# Patient Record
Sex: FEMALE | Race: BLACK OR AFRICAN AMERICAN | Employment: FULL TIME | URBAN - METROPOLITAN AREA
[De-identification: names, ages, dates, MRNs, and addresses within clinical notes are randomized per-mention and may not be internally consistent; named-entity substitution may affect disease eponyms.]

---

## 2021-09-27 ENCOUNTER — HOSPITAL ENCOUNTER (OUTPATIENT)
Dept: PREADMISSION TESTING | Age: 71
Discharge: HOME | End: 2021-09-27
Payer: MEDICARE

## 2021-09-27 VITALS
RESPIRATION RATE: 18 BRPM | WEIGHT: 267.2 LBS | HEART RATE: 62 BPM | DIASTOLIC BLOOD PRESSURE: 88 MMHG | BODY MASS INDEX: 36.19 KG/M2 | TEMPERATURE: 97.8 F | SYSTOLIC BLOOD PRESSURE: 130 MMHG | HEIGHT: 72 IN | OXYGEN SATURATION: 99 %

## 2021-09-27 LAB
ABO + RH BLD: NORMAL
ANION GAP SERPL CALC-SCNC: 7 MMOL/L (ref 5–15)
APPEARANCE UR: CLEAR
BACTERIA URNS QL MICRO: NEGATIVE /HPF
BILIRUB UR QL: NEGATIVE
BLOOD GROUP ANTIBODIES SERPL: NEGATIVE
BUN SERPL-MCNC: 16 MG/DL (ref 6–20)
BUN/CREAT SERPL: 24 (ref 12–20)
CA-I BLD-MCNC: 9 MG/DL (ref 8.5–10.1)
CHLORIDE SERPL-SCNC: 105 MMOL/L (ref 97–108)
CO2 SERPL-SCNC: 27 MMOL/L (ref 21–32)
COLOR UR: ABNORMAL
CREAT SERPL-MCNC: 0.67 MG/DL (ref 0.55–1.02)
ERYTHROCYTE [DISTWIDTH] IN BLOOD BY AUTOMATED COUNT: 16.4 % (ref 11.5–14.5)
GLUCOSE SERPL-MCNC: 104 MG/DL (ref 65–100)
GLUCOSE UR STRIP.AUTO-MCNC: NEGATIVE MG/DL
HCT VFR BLD AUTO: 37.5 % (ref 35–47)
HGB BLD-MCNC: 12.3 G/DL (ref 11.5–16)
HGB UR QL STRIP: ABNORMAL
INR PPP: 1.1 (ref 0.9–1.1)
KETONES UR QL STRIP.AUTO: NEGATIVE MG/DL
LEUKOCYTE ESTERASE UR QL STRIP.AUTO: ABNORMAL
MCH RBC QN AUTO: 25.6 PG (ref 26–34)
MCHC RBC AUTO-ENTMCNC: 32.8 G/DL (ref 30–36.5)
MCV RBC AUTO: 78 FL (ref 80–99)
MRSA DNA SPEC QL NAA+PROBE: NOT DETECTED
MUCOUS THREADS URNS QL MICRO: ABNORMAL /LPF
NITRITE UR QL STRIP.AUTO: NEGATIVE
NRBC # BLD: 0 K/UL (ref 0–0.01)
NRBC BLD-RTO: 0 PER 100 WBC
PH UR STRIP: 5 [PH] (ref 5–8)
PLATELET # BLD AUTO: 275 K/UL (ref 150–400)
PMV BLD AUTO: 10.4 FL (ref 8.9–12.9)
POTASSIUM SERPL-SCNC: 3.5 MMOL/L (ref 3.5–5.1)
PROT UR STRIP-MCNC: NEGATIVE MG/DL
PROTHROMBIN TIME: 13.8 SEC (ref 11.9–14.7)
RBC # BLD AUTO: 4.81 M/UL (ref 3.8–5.2)
RBC #/AREA URNS HPF: ABNORMAL /HPF (ref 0–5)
SODIUM SERPL-SCNC: 139 MMOL/L (ref 136–145)
SP GR UR REFRACTOMETRY: 1.01 (ref 1–1.03)
SPECIMEN EXP DATE BLD: NORMAL
UROBILINOGEN UR QL STRIP.AUTO: 0.1 EU/DL (ref 0.1–1)
WBC # BLD AUTO: 7.8 K/UL (ref 3.6–11)
WBC URNS QL MICRO: ABNORMAL /HPF (ref 0–4)

## 2021-09-27 PROCEDURE — 85027 COMPLETE CBC AUTOMATED: CPT

## 2021-09-27 PROCEDURE — 80048 BASIC METABOLIC PNL TOTAL CA: CPT

## 2021-09-27 PROCEDURE — 87641 MR-STAPH DNA AMP PROBE: CPT

## 2021-09-27 PROCEDURE — 81001 URINALYSIS AUTO W/SCOPE: CPT

## 2021-09-27 PROCEDURE — 86901 BLOOD TYPING SEROLOGIC RH(D): CPT

## 2021-09-27 PROCEDURE — 85610 PROTHROMBIN TIME: CPT

## 2021-09-27 PROCEDURE — 36415 COLL VENOUS BLD VENIPUNCTURE: CPT

## 2021-09-27 RX ORDER — ASPIRIN 81 MG/1
81 TABLET ORAL DAILY
COMMUNITY
End: 2021-10-12

## 2021-09-27 RX ORDER — ATORVASTATIN CALCIUM 20 MG/1
20 TABLET, FILM COATED ORAL DAILY
COMMUNITY

## 2021-09-27 RX ORDER — LANOLIN ALCOHOL/MO/W.PET/CERES
2000 CREAM (GRAM) TOPICAL DAILY
COMMUNITY

## 2021-09-27 RX ORDER — LEVOTHYROXINE SODIUM 125 UG/1
125 TABLET ORAL
COMMUNITY

## 2021-09-27 NOTE — PROGRESS NOTES
Called to Dr Magdalena Paz office, left message for Kimberly Frances regarding pt holding baby aspirin and if so how many days. Pt states PCP put her on her aspirin.

## 2021-09-27 NOTE — PROGRESS NOTES
Per pt request  Sekou Vargas present during PAT visit.    Pt denies any history of DVT or PE with self or family

## 2021-09-28 NOTE — PROGRESS NOTES
Per Precious Guzmán at Dr Fiorella Rabago office, pt to hold asa for 3 days prior to surgery. Will call pcp for hold note and will inform pt to hold.

## 2021-10-08 ENCOUNTER — HOSPITAL ENCOUNTER (OUTPATIENT)
Dept: PREADMISSION TESTING | Age: 71
Discharge: HOME | End: 2021-10-08
Payer: MEDICARE

## 2021-10-08 LAB — SARS-COV-2, COV2: NORMAL

## 2021-10-08 PROCEDURE — U0005 INFEC AGEN DETEC AMPLI PROBE: HCPCS

## 2021-10-08 RX ORDER — ROPIVACAINE HYDROCHLORIDE 5 MG/ML
150 INJECTION, SOLUTION EPIDURAL; INFILTRATION; PERINEURAL
Status: CANCELLED | OUTPATIENT
Start: 2021-10-08 | End: 2021-10-08

## 2021-10-10 ENCOUNTER — ANESTHESIA EVENT (OUTPATIENT)
Dept: SURGERY | Age: 71
End: 2021-10-10
Payer: MEDICARE

## 2021-10-10 LAB
SARS-COV-2, XPLCVT: NOT DETECTED
SOURCE, COVRS: NORMAL

## 2021-10-11 ENCOUNTER — ANESTHESIA (OUTPATIENT)
Dept: SURGERY | Age: 71
End: 2021-10-11
Payer: MEDICARE

## 2021-10-11 ENCOUNTER — HOSPITAL ENCOUNTER (OUTPATIENT)
Age: 71
Discharge: HOME HEALTH | End: 2021-10-12
Attending: ORTHOPAEDIC SURGERY | Admitting: ORTHOPAEDIC SURGERY
Payer: MEDICARE

## 2021-10-11 ENCOUNTER — APPOINTMENT (OUTPATIENT)
Dept: GENERAL RADIOLOGY | Age: 71
End: 2021-10-11
Attending: ORTHOPAEDIC SURGERY
Payer: MEDICARE

## 2021-10-11 DIAGNOSIS — M17.12 PRIMARY OSTEOARTHRITIS OF LEFT KNEE: Primary | ICD-10-CM

## 2021-10-11 PROBLEM — M19.90 OSTEOARTHRITIS: Status: ACTIVE | Noted: 2021-10-11

## 2021-10-11 PROCEDURE — 74011000250 HC RX REV CODE- 250: Performed by: NURSE ANESTHETIST, CERTIFIED REGISTERED

## 2021-10-11 PROCEDURE — C1713 ANCHOR/SCREW BN/BN,TIS/BN: HCPCS | Performed by: ORTHOPAEDIC SURGERY

## 2021-10-11 PROCEDURE — 77030033067 HC SUT PDO STRATFX SPIR J&J -B: Performed by: ORTHOPAEDIC SURGERY

## 2021-10-11 PROCEDURE — 77030002982 HC SUT POLYSRB J&J -A: Performed by: ORTHOPAEDIC SURGERY

## 2021-10-11 PROCEDURE — 74011000250 HC RX REV CODE- 250: Performed by: ORTHOPAEDIC SURGERY

## 2021-10-11 PROCEDURE — 74011000258 HC RX REV CODE- 258: Performed by: ORTHOPAEDIC SURGERY

## 2021-10-11 PROCEDURE — 77030041279 HC DRSG PRMSL AG MDII -B: Performed by: ORTHOPAEDIC SURGERY

## 2021-10-11 PROCEDURE — 74011000250 HC RX REV CODE- 250: Performed by: ANESTHESIOLOGY

## 2021-10-11 PROCEDURE — 74011000258 HC RX REV CODE- 258: Performed by: NURSE ANESTHETIST, CERTIFIED REGISTERED

## 2021-10-11 PROCEDURE — 77030010785: Performed by: ORTHOPAEDIC SURGERY

## 2021-10-11 PROCEDURE — 74011250636 HC RX REV CODE- 250/636: Performed by: NURSE ANESTHETIST, CERTIFIED REGISTERED

## 2021-10-11 PROCEDURE — 94762 N-INVAS EAR/PLS OXIMTRY CONT: CPT

## 2021-10-11 PROCEDURE — 76060000036 HC ANESTHESIA 2.5 TO 3 HR: Performed by: ORTHOPAEDIC SURGERY

## 2021-10-11 PROCEDURE — 77030034479 HC ADH SKN CLSR PRINEO J&J -B: Performed by: ORTHOPAEDIC SURGERY

## 2021-10-11 PROCEDURE — 76010000172 HC OR TIME 2.5 TO 3 HR INTENSV-TIER 1: Performed by: ORTHOPAEDIC SURGERY

## 2021-10-11 PROCEDURE — 97161 PT EVAL LOW COMPLEX 20 MIN: CPT

## 2021-10-11 PROCEDURE — 77030018673: Performed by: ORTHOPAEDIC SURGERY

## 2021-10-11 PROCEDURE — 77030013708 HC HNDPC SUC IRR PULS STRY –B: Performed by: ORTHOPAEDIC SURGERY

## 2021-10-11 PROCEDURE — 76210000016 HC OR PH I REC 1 TO 1.5 HR: Performed by: ORTHOPAEDIC SURGERY

## 2021-10-11 PROCEDURE — 77030000032 HC CUF TRNQT ZIMM -B: Performed by: ORTHOPAEDIC SURGERY

## 2021-10-11 PROCEDURE — 77030038692 HC WND DEB SYS IRMX -B: Performed by: ORTHOPAEDIC SURGERY

## 2021-10-11 PROCEDURE — 77030031139 HC SUT VCRL2 J&J -A: Performed by: ORTHOPAEDIC SURGERY

## 2021-10-11 PROCEDURE — 97530 THERAPEUTIC ACTIVITIES: CPT

## 2021-10-11 PROCEDURE — 77030002933 HC SUT MCRYL J&J -A: Performed by: ORTHOPAEDIC SURGERY

## 2021-10-11 PROCEDURE — 74011250637 HC RX REV CODE- 250/637: Performed by: ORTHOPAEDIC SURGERY

## 2021-10-11 PROCEDURE — 2709999900 HC NON-CHARGEABLE SUPPLY: Performed by: ORTHOPAEDIC SURGERY

## 2021-10-11 PROCEDURE — C1776 JOINT DEVICE (IMPLANTABLE): HCPCS | Performed by: ORTHOPAEDIC SURGERY

## 2021-10-11 PROCEDURE — 74011250636 HC RX REV CODE- 250/636: Performed by: ORTHOPAEDIC SURGERY

## 2021-10-11 PROCEDURE — 77030040361 HC SLV COMPR DVT MDII -B: Performed by: ORTHOPAEDIC SURGERY

## 2021-10-11 PROCEDURE — 73560 X-RAY EXAM OF KNEE 1 OR 2: CPT

## 2021-10-11 DEVICE — COMPONENT FEM SZ 8 L KNEE POST STBL CEM ATTUNE: Type: IMPLANTABLE DEVICE | Site: KNEE | Status: FUNCTIONAL

## 2021-10-11 DEVICE — COMPONENT PAT DIA41MM POLYETH DOME CEM MEDIALIZED ATTUNE: Type: IMPLANTABLE DEVICE | Site: KNEE | Status: FUNCTIONAL

## 2021-10-11 DEVICE — INSERT TIB SZ 8 THK6MM KNEE POST STBL FIX BEAR ATTUNE: Type: IMPLANTABLE DEVICE | Site: KNEE | Status: FUNCTIONAL

## 2021-10-11 DEVICE — BASEPLATE TIB SZ 7 FIX BEAR CEM ATTUNE: Type: IMPLANTABLE DEVICE | Site: KNEE | Status: FUNCTIONAL

## 2021-10-11 DEVICE — KNEE K1 TOT HEMI STD CEM IMPL CAPPED SYNTHES: Type: IMPLANTABLE DEVICE | Status: FUNCTIONAL

## 2021-10-11 DEVICE — CEMENT BNE 20ML 40GM FULL DOSE PMMA W/O ANTIBIO M VISC: Type: IMPLANTABLE DEVICE | Site: KNEE | Status: FUNCTIONAL

## 2021-10-11 RX ORDER — MIDAZOLAM HYDROCHLORIDE 1 MG/ML
0.5 INJECTION, SOLUTION INTRAMUSCULAR; INTRAVENOUS
Status: DISCONTINUED | OUTPATIENT
Start: 2021-10-11 | End: 2021-10-11 | Stop reason: HOSPADM

## 2021-10-11 RX ORDER — SODIUM CHLORIDE 0.9 % (FLUSH) 0.9 %
5-40 SYRINGE (ML) INJECTION EVERY 8 HOURS
Status: DISCONTINUED | OUTPATIENT
Start: 2021-10-11 | End: 2021-10-12 | Stop reason: HOSPADM

## 2021-10-11 RX ORDER — HYDROCODONE BITARTRATE AND ACETAMINOPHEN 5; 325 MG/1; MG/1
1 TABLET ORAL AS NEEDED
Status: DISCONTINUED | OUTPATIENT
Start: 2021-10-11 | End: 2021-10-11 | Stop reason: HOSPADM

## 2021-10-11 RX ORDER — SODIUM CHLORIDE 0.9 % (FLUSH) 0.9 %
5-40 SYRINGE (ML) INJECTION AS NEEDED
Status: DISCONTINUED | OUTPATIENT
Start: 2021-10-11 | End: 2021-10-11 | Stop reason: HOSPADM

## 2021-10-11 RX ORDER — MIDAZOLAM HYDROCHLORIDE 1 MG/ML
1 INJECTION, SOLUTION INTRAMUSCULAR; INTRAVENOUS AS NEEDED
Status: DISCONTINUED | OUTPATIENT
Start: 2021-10-11 | End: 2021-10-11 | Stop reason: HOSPADM

## 2021-10-11 RX ORDER — ASPIRIN 325 MG
325 TABLET, DELAYED RELEASE (ENTERIC COATED) ORAL 2 TIMES DAILY
Status: DISCONTINUED | OUTPATIENT
Start: 2021-10-11 | End: 2021-10-12 | Stop reason: HOSPADM

## 2021-10-11 RX ORDER — BUPIVACAINE HYDROCHLORIDE 5 MG/ML
INJECTION, SOLUTION EPIDURAL; INTRACAUDAL
Status: COMPLETED
Start: 2021-10-11 | End: 2021-10-11

## 2021-10-11 RX ORDER — SODIUM CHLORIDE, SODIUM LACTATE, POTASSIUM CHLORIDE, CALCIUM CHLORIDE 600; 310; 30; 20 MG/100ML; MG/100ML; MG/100ML; MG/100ML
20 INJECTION, SOLUTION INTRAVENOUS CONTINUOUS
Status: DISCONTINUED | OUTPATIENT
Start: 2021-10-11 | End: 2021-10-12 | Stop reason: HOSPADM

## 2021-10-11 RX ORDER — EPHEDRINE SULFATE/0.9% NACL/PF 50 MG/5 ML
5 SYRINGE (ML) INTRAVENOUS AS NEEDED
Status: DISCONTINUED | OUTPATIENT
Start: 2021-10-11 | End: 2021-10-11 | Stop reason: HOSPADM

## 2021-10-11 RX ORDER — POLYETHYLENE GLYCOL 3350 17 G/17G
17 POWDER, FOR SOLUTION ORAL DAILY
Status: DISCONTINUED | OUTPATIENT
Start: 2021-10-11 | End: 2021-10-12 | Stop reason: HOSPADM

## 2021-10-11 RX ORDER — SODIUM CHLORIDE 0.9 % (FLUSH) 0.9 %
5-40 SYRINGE (ML) INJECTION EVERY 8 HOURS
Status: DISCONTINUED | OUTPATIENT
Start: 2021-10-11 | End: 2021-10-11 | Stop reason: HOSPADM

## 2021-10-11 RX ORDER — METOPROLOL TARTRATE 5 MG/5ML
2.5 INJECTION INTRAVENOUS
Status: DISCONTINUED | OUTPATIENT
Start: 2021-10-11 | End: 2021-10-11 | Stop reason: HOSPADM

## 2021-10-11 RX ORDER — TRAMADOL HYDROCHLORIDE 50 MG/1
50 TABLET ORAL EVERY 6 HOURS
Status: DISCONTINUED | OUTPATIENT
Start: 2021-10-11 | End: 2021-10-12 | Stop reason: HOSPADM

## 2021-10-11 RX ORDER — MORPHINE SULFATE 10 MG/ML
2 INJECTION, SOLUTION INTRAMUSCULAR; INTRAVENOUS
Status: ACTIVE | OUTPATIENT
Start: 2021-10-11 | End: 2021-10-12

## 2021-10-11 RX ORDER — LANOLIN ALCOHOL/MO/W.PET/CERES
2000 CREAM (GRAM) TOPICAL DAILY
Status: DISCONTINUED | OUTPATIENT
Start: 2021-10-11 | End: 2021-10-11

## 2021-10-11 RX ORDER — KETAMINE HYDROCHLORIDE 10 MG/ML
INJECTION, SOLUTION INTRAMUSCULAR; INTRAVENOUS AS NEEDED
Status: DISCONTINUED | OUTPATIENT
Start: 2021-10-11 | End: 2021-10-11 | Stop reason: HOSPADM

## 2021-10-11 RX ORDER — ATORVASTATIN CALCIUM 20 MG/1
20 TABLET, FILM COATED ORAL DAILY
Status: DISCONTINUED | OUTPATIENT
Start: 2021-10-11 | End: 2021-10-12 | Stop reason: HOSPADM

## 2021-10-11 RX ORDER — FENTANYL CITRATE 50 UG/ML
50 INJECTION, SOLUTION INTRAMUSCULAR; INTRAVENOUS
Status: DISCONTINUED | OUTPATIENT
Start: 2021-10-11 | End: 2021-10-11 | Stop reason: HOSPADM

## 2021-10-11 RX ORDER — SODIUM CHLORIDE 9 MG/ML
125 INJECTION, SOLUTION INTRAVENOUS CONTINUOUS
Status: DISPENSED | OUTPATIENT
Start: 2021-10-11 | End: 2021-10-12

## 2021-10-11 RX ORDER — LABETALOL HCL 20 MG/4 ML
5 SYRINGE (ML) INTRAVENOUS
Status: DISCONTINUED | OUTPATIENT
Start: 2021-10-11 | End: 2021-10-11 | Stop reason: HOSPADM

## 2021-10-11 RX ORDER — DIPHENHYDRAMINE HYDROCHLORIDE 50 MG/ML
12.5 INJECTION, SOLUTION INTRAMUSCULAR; INTRAVENOUS AS NEEDED
Status: DISCONTINUED | OUTPATIENT
Start: 2021-10-11 | End: 2021-10-11 | Stop reason: HOSPADM

## 2021-10-11 RX ORDER — BUPIVACAINE HYDROCHLORIDE 7.5 MG/ML
INJECTION, SOLUTION INTRASPINAL
Status: COMPLETED | OUTPATIENT
Start: 2021-10-11 | End: 2021-10-11

## 2021-10-11 RX ORDER — BUPIVACAINE HYDROCHLORIDE 5 MG/ML
INJECTION, SOLUTION EPIDURAL; INTRACAUDAL
Status: COMPLETED | OUTPATIENT
Start: 2021-10-11 | End: 2021-10-11

## 2021-10-11 RX ORDER — LIDOCAINE HYDROCHLORIDE 20 MG/ML
INJECTION, SOLUTION EPIDURAL; INFILTRATION; INTRACAUDAL; PERINEURAL AS NEEDED
Status: DISCONTINUED | OUTPATIENT
Start: 2021-10-11 | End: 2021-10-11 | Stop reason: HOSPADM

## 2021-10-11 RX ORDER — PROPOFOL 10 MG/ML
INJECTION, EMULSION INTRAVENOUS
Status: DISCONTINUED | OUTPATIENT
Start: 2021-10-11 | End: 2021-10-11 | Stop reason: HOSPADM

## 2021-10-11 RX ORDER — DEXAMETHASONE SODIUM PHOSPHATE 4 MG/ML
INJECTION, SOLUTION INTRA-ARTICULAR; INTRALESIONAL; INTRAMUSCULAR; INTRAVENOUS; SOFT TISSUE
Status: COMPLETED | OUTPATIENT
Start: 2021-10-11 | End: 2021-10-11

## 2021-10-11 RX ORDER — FENTANYL CITRATE 50 UG/ML
50 INJECTION, SOLUTION INTRAMUSCULAR; INTRAVENOUS AS NEEDED
Status: DISCONTINUED | OUTPATIENT
Start: 2021-10-11 | End: 2021-10-11 | Stop reason: HOSPADM

## 2021-10-11 RX ORDER — DEXAMETHASONE SODIUM PHOSPHATE 4 MG/ML
INJECTION, SOLUTION INTRA-ARTICULAR; INTRALESIONAL; INTRAMUSCULAR; INTRAVENOUS; SOFT TISSUE AS NEEDED
Status: DISCONTINUED | OUTPATIENT
Start: 2021-10-11 | End: 2021-10-11 | Stop reason: HOSPADM

## 2021-10-11 RX ORDER — OXYCODONE HYDROCHLORIDE 5 MG/1
5 TABLET ORAL
Status: DISCONTINUED | OUTPATIENT
Start: 2021-10-11 | End: 2021-10-12 | Stop reason: HOSPADM

## 2021-10-11 RX ORDER — ONDANSETRON 2 MG/ML
INJECTION INTRAMUSCULAR; INTRAVENOUS AS NEEDED
Status: DISCONTINUED | OUTPATIENT
Start: 2021-10-11 | End: 2021-10-11 | Stop reason: HOSPADM

## 2021-10-11 RX ORDER — FENTANYL CITRATE 50 UG/ML
INJECTION, SOLUTION INTRAMUSCULAR; INTRAVENOUS
Status: COMPLETED
Start: 2021-10-11 | End: 2021-10-11

## 2021-10-11 RX ORDER — FACIAL-BODY WIPES
10 EACH TOPICAL DAILY PRN
Status: DISCONTINUED | OUTPATIENT
Start: 2021-10-13 | End: 2021-10-12 | Stop reason: HOSPADM

## 2021-10-11 RX ORDER — DEXAMETHASONE SODIUM PHOSPHATE 10 MG/ML
INJECTION INTRAMUSCULAR; INTRAVENOUS
Status: DISPENSED
Start: 2021-10-11 | End: 2021-10-11

## 2021-10-11 RX ORDER — HYDROMORPHONE HYDROCHLORIDE 1 MG/ML
0.4 INJECTION, SOLUTION INTRAMUSCULAR; INTRAVENOUS; SUBCUTANEOUS
Status: DISCONTINUED | OUTPATIENT
Start: 2021-10-11 | End: 2021-10-11 | Stop reason: HOSPADM

## 2021-10-11 RX ORDER — SODIUM CHLORIDE 0.9 % (FLUSH) 0.9 %
5-40 SYRINGE (ML) INJECTION AS NEEDED
Status: DISCONTINUED | OUTPATIENT
Start: 2021-10-11 | End: 2021-10-12 | Stop reason: HOSPADM

## 2021-10-11 RX ORDER — PROPOFOL 10 MG/ML
INJECTION, EMULSION INTRAVENOUS AS NEEDED
Status: DISCONTINUED | OUTPATIENT
Start: 2021-10-11 | End: 2021-10-11 | Stop reason: HOSPADM

## 2021-10-11 RX ORDER — HYDRALAZINE HYDROCHLORIDE 20 MG/ML
10 INJECTION INTRAMUSCULAR; INTRAVENOUS
Status: DISCONTINUED | OUTPATIENT
Start: 2021-10-11 | End: 2021-10-11 | Stop reason: HOSPADM

## 2021-10-11 RX ORDER — ROPIVACAINE HYDROCHLORIDE 5 MG/ML
INJECTION, SOLUTION EPIDURAL; INFILTRATION; PERINEURAL AS NEEDED
Status: DISCONTINUED | OUTPATIENT
Start: 2021-10-11 | End: 2021-10-12 | Stop reason: HOSPADM

## 2021-10-11 RX ORDER — ONDANSETRON 2 MG/ML
4 INJECTION INTRAMUSCULAR; INTRAVENOUS
Status: ACTIVE | OUTPATIENT
Start: 2021-10-11 | End: 2021-10-12

## 2021-10-11 RX ORDER — FENTANYL CITRATE 50 UG/ML
INJECTION, SOLUTION INTRAMUSCULAR; INTRAVENOUS AS NEEDED
Status: DISCONTINUED | OUTPATIENT
Start: 2021-10-11 | End: 2021-10-11 | Stop reason: HOSPADM

## 2021-10-11 RX ORDER — ONDANSETRON 2 MG/ML
4 INJECTION INTRAMUSCULAR; INTRAVENOUS AS NEEDED
Status: DISCONTINUED | OUTPATIENT
Start: 2021-10-11 | End: 2021-10-11 | Stop reason: HOSPADM

## 2021-10-11 RX ORDER — NALOXONE HYDROCHLORIDE 0.4 MG/ML
0.4 INJECTION, SOLUTION INTRAMUSCULAR; INTRAVENOUS; SUBCUTANEOUS AS NEEDED
Status: DISCONTINUED | OUTPATIENT
Start: 2021-10-11 | End: 2021-10-12 | Stop reason: HOSPADM

## 2021-10-11 RX ORDER — FENTANYL CITRATE 50 UG/ML
INJECTION, SOLUTION INTRAMUSCULAR; INTRAVENOUS
Status: COMPLETED | OUTPATIENT
Start: 2021-10-11 | End: 2021-10-11

## 2021-10-11 RX ORDER — ACETAMINOPHEN 325 MG/1
650 TABLET ORAL EVERY 6 HOURS
Status: DISCONTINUED | OUTPATIENT
Start: 2021-10-11 | End: 2021-10-12 | Stop reason: HOSPADM

## 2021-10-11 RX ORDER — SODIUM CHLORIDE, SODIUM LACTATE, POTASSIUM CHLORIDE, CALCIUM CHLORIDE 600; 310; 30; 20 MG/100ML; MG/100ML; MG/100ML; MG/100ML
INJECTION, SOLUTION INTRAVENOUS
Status: DISCONTINUED | OUTPATIENT
Start: 2021-10-11 | End: 2021-10-11 | Stop reason: HOSPADM

## 2021-10-11 RX ORDER — CELECOXIB 200 MG/1
200 CAPSULE ORAL 2 TIMES DAILY
Status: DISCONTINUED | OUTPATIENT
Start: 2021-10-11 | End: 2021-10-12 | Stop reason: HOSPADM

## 2021-10-11 RX ORDER — LIDOCAINE HYDROCHLORIDE 10 MG/ML
0.1 INJECTION, SOLUTION EPIDURAL; INFILTRATION; INTRACAUDAL; PERINEURAL AS NEEDED
Status: DISCONTINUED | OUTPATIENT
Start: 2021-10-11 | End: 2021-10-11 | Stop reason: HOSPADM

## 2021-10-11 RX ORDER — KETOROLAC TROMETHAMINE 30 MG/ML
INJECTION, SOLUTION INTRAMUSCULAR; INTRAVENOUS AS NEEDED
Status: DISCONTINUED | OUTPATIENT
Start: 2021-10-11 | End: 2021-10-11 | Stop reason: HOSPADM

## 2021-10-11 RX ORDER — AMOXICILLIN 250 MG
1 CAPSULE ORAL 2 TIMES DAILY
Status: DISCONTINUED | OUTPATIENT
Start: 2021-10-11 | End: 2021-10-12 | Stop reason: HOSPADM

## 2021-10-11 RX ADMIN — ONDANSETRON 4 MG: 2 INJECTION INTRAMUSCULAR; INTRAVENOUS at 09:40

## 2021-10-11 RX ADMIN — BUPIVACAINE HYDROCHLORIDE 15 MG: 7.5 INJECTION, SOLUTION INTRASPINAL at 07:25

## 2021-10-11 RX ADMIN — PROPOFOL 75 MCG/KG/MIN: 10 INJECTION, EMULSION INTRAVENOUS at 07:58

## 2021-10-11 RX ADMIN — DOCUSATE SODIUM 50 MG AND SENNOSIDES 8.6 MG 1 TABLET: 8.6; 5 TABLET, FILM COATED ORAL at 21:50

## 2021-10-11 RX ADMIN — FENTANYL CITRATE 100 MCG: 0.05 INJECTION, SOLUTION INTRAMUSCULAR; INTRAVENOUS at 07:41

## 2021-10-11 RX ADMIN — SODIUM CHLORIDE 125 ML/HR: 9 INJECTION, SOLUTION INTRAVENOUS at 11:51

## 2021-10-11 RX ADMIN — KETOROLAC TROMETHAMINE 15 MG: 30 INJECTION, SOLUTION INTRAMUSCULAR at 10:04

## 2021-10-11 RX ADMIN — Medication 10 ML: at 21:51

## 2021-10-11 RX ADMIN — TRAMADOL HYDROCHLORIDE 50 MG: 50 TABLET, FILM COATED ORAL at 11:47

## 2021-10-11 RX ADMIN — PHENYLEPHRINE HYDROCHLORIDE 100 MCG: 10 INJECTION INTRAVENOUS at 08:05

## 2021-10-11 RX ADMIN — TRAMADOL HYDROCHLORIDE 50 MG: 50 TABLET, FILM COATED ORAL at 18:02

## 2021-10-11 RX ADMIN — CELECOXIB 200 MG: 200 CAPSULE ORAL at 11:47

## 2021-10-11 RX ADMIN — FENTANYL CITRATE 100 MCG: 50 INJECTION, SOLUTION INTRAMUSCULAR; INTRAVENOUS at 07:05

## 2021-10-11 RX ADMIN — KETAMINE HYDROCHLORIDE 30 MG: 10 INJECTION INTRAMUSCULAR; INTRAVENOUS at 07:44

## 2021-10-11 RX ADMIN — TRANEXAMIC ACID 1 G: 1 INJECTION, SOLUTION INTRAVENOUS at 09:35

## 2021-10-11 RX ADMIN — ACETAMINOPHEN 650 MG: 325 TABLET ORAL at 18:02

## 2021-10-11 RX ADMIN — CEFAZOLIN SODIUM 3 G: 1 INJECTION, POWDER, FOR SOLUTION INTRAMUSCULAR; INTRAVENOUS at 07:55

## 2021-10-11 RX ADMIN — PROPOFOL 20 MG: 10 INJECTION, EMULSION INTRAVENOUS at 10:00

## 2021-10-11 RX ADMIN — PHENYLEPHRINE HYDROCHLORIDE 100 MCG: 10 INJECTION INTRAVENOUS at 08:15

## 2021-10-11 RX ADMIN — DOCUSATE SODIUM 50 MG AND SENNOSIDES 8.6 MG 1 TABLET: 8.6; 5 TABLET, FILM COATED ORAL at 11:48

## 2021-10-11 RX ADMIN — BUPIVACAINE HYDROCHLORIDE 30 ML: 5 INJECTION, SOLUTION EPIDURAL; INTRACAUDAL; PERINEURAL at 07:05

## 2021-10-11 RX ADMIN — LIDOCAINE HYDROCHLORIDE 40 MG: 20 INJECTION, SOLUTION EPIDURAL; INFILTRATION; INTRACAUDAL; PERINEURAL at 07:44

## 2021-10-11 RX ADMIN — ASPIRIN 325 MG: 325 TABLET, COATED ORAL at 21:51

## 2021-10-11 RX ADMIN — SODIUM CHLORIDE, POTASSIUM CHLORIDE, SODIUM LACTATE AND CALCIUM CHLORIDE: 600; 310; 30; 20 INJECTION, SOLUTION INTRAVENOUS at 07:39

## 2021-10-11 RX ADMIN — TRANEXAMIC ACID 1 G: 1 INJECTION, SOLUTION INTRAVENOUS at 07:51

## 2021-10-11 RX ADMIN — ASPIRIN 325 MG: 325 TABLET, COATED ORAL at 11:48

## 2021-10-11 RX ADMIN — DEXAMETHASONE SODIUM PHOSPHATE 4 MG: 4 INJECTION, SOLUTION INTRA-ARTICULAR; INTRALESIONAL; INTRAMUSCULAR; INTRAVENOUS; SOFT TISSUE at 07:44

## 2021-10-11 RX ADMIN — ACETAMINOPHEN 650 MG: 325 TABLET ORAL at 11:47

## 2021-10-11 RX ADMIN — CEFAZOLIN 3 G: 1 INJECTION, POWDER, FOR SOLUTION INTRAMUSCULAR; INTRAVENOUS at 16:44

## 2021-10-11 RX ADMIN — PROPOFOL 50 MG: 10 INJECTION, EMULSION INTRAVENOUS at 07:41

## 2021-10-11 RX ADMIN — CELECOXIB 200 MG: 200 CAPSULE ORAL at 21:50

## 2021-10-11 RX ADMIN — ATORVASTATIN CALCIUM 20 MG: 20 TABLET, FILM COATED ORAL at 11:47

## 2021-10-11 RX ADMIN — PHENYLEPHRINE HYDROCHLORIDE 30 MCG/MIN: 10 INJECTION INTRAVENOUS at 08:22

## 2021-10-11 RX ADMIN — DEXAMETHASONE SODIUM PHOSPHATE 4 MG: 4 INJECTION, SOLUTION INTRA-ARTICULAR; INTRALESIONAL; INTRAMUSCULAR; INTRAVENOUS; SOFT TISSUE at 07:05

## 2021-10-11 NOTE — ANESTHESIA PROCEDURE NOTES
Spinal Block    Start time: 10/11/2021 7:20 AM  End time: 10/11/2021 7:30 AM  Performed by: James Hyatt MD  Authorized by: James Hyatt MD     Pre-procedure:   Indications: primary anesthetic  Preanesthetic Checklist: patient identified, risks and benefits discussed, anesthesia consent, site marked, patient being monitored and timeout performed    Timeout Time: 07:20 EDT          Spinal Block:   Patient Position:  Seated  Prep Region:  Lumbar  Prep: Betadine      Location:  L3-4  Technique:  Single shot        Needle:   Needle Type:  Pencan  Needle Gauge:  25 G  Attempts:  1      Events: CSF confirmed, no blood with aspiration and no paresthesia        Assessment:  Insertion:  Uncomplicated  Patient tolerance:  Patient tolerated the procedure well with no immediate complications

## 2021-10-11 NOTE — ANESTHESIA PROCEDURE NOTES
Peripheral Block    Start time: 10/11/2021 6:50 AM  End time: 10/11/2021 7:00 AM  Performed by: Niitn Jackson MD  Authorized by: Nitin Jackson MD       Pre-procedure: Indications: at surgeon's request, post-op pain management and procedure for pain    Preanesthetic Checklist: patient identified, risks and benefits discussed, site marked, timeout performed, anesthesia consent given and patient being monitored    Timeout Time: 07:05 EDT          Block Type:   Block Type:   Adductor canal block  Laterality:  Left  Monitoring:  Standard ASA monitoring, continuous pulse ox, oxygen, frequent vital sign checks, heart rate and responsive to questions  Injection Technique:  Single shot  Procedures: ultrasound guided    Patient Position: supine  Prep: chlorhexidine    Location:  Mid thigh  Needle Type:  Pajunk  Needle Gauge:  21 G  Needle Localization:  Ultrasound guidance  Medication Injected:  FentaNYL citrate (PF) injection sedation initial, 100 mcg  bupivacaine (PF) (MARCAINE) 0.5% injection, 30 mL  dexamethasone (DECADRON) 4 mg/mL injection, 4 mg  Med Admin Time: 10/11/2021 7:05 AM    Assessment:  Number of attempts:  1  Injection Assessment:  Incremental injection every 5 mL, negative aspiration for CSF, no paresthesia, local visualized surrounding nerve on ultrasound, negative aspiration for blood and no intravascular symptoms  Patient tolerance:  Patient tolerated the procedure well with no immediate complications

## 2021-10-11 NOTE — PROGRESS NOTES
Bedside shift change report given to Kalee Richardson (oncoming nurse) by Jaison Coates (offgoing nurse). Report included the following information SBAR.

## 2021-10-11 NOTE — H&P
Consult    Subjective:     Basia Arellano is a 66 y.o. female who was seen in clinic 6 weeks ago for chronic knee pain. She was undergone nonoperative treatment in terms of oral pain medication, anti-inflammatory medication, intra-articular injection and activity modification. Once nonoperative treatment was exhausted, knee replacement was discussed. Patient is here in the holding area to receive the surgery.     Pain mostly on the lateral aspect of the knee  6-7 out of 10  Dull aching  No complaints of tingling or numbness  Complains of knee giving out    Past Medical History:   Diagnosis Date    Arthritis     History of colonic polyps     Hyperlipidemia     Sleep apnea     Thyroid disease     hypothyroidism      Past Surgical History:   Procedure Laterality Date    HX COLONOSCOPY      HX HYSTERECTOMY      HX HYSTEROSCOPY      HX ORTHOPAEDIC      lumbar    [place] ABDOMEN SURGERY PROC UNLISTED      hiatal hernia     Family History   Problem Relation Age of Onset    Diabetes Mother     Cancer Father       Social History     Tobacco Use    Smoking status: Never Smoker    Smokeless tobacco: Never Used   Substance Use Topics    Alcohol use: Never       Current Facility-Administered Medications   Medication Dose Route Frequency Provider Last Rate Last Admin    lactated Ringers infusion  20 mL/hr IntraVENous CONTINUOUS Steven Trent MD        ceFAZolin (ANCEF) 3 g in sterile water (preservative free) 30 mL IV syringe  3 g IntraVENous ONCE Steven Nielsen MD        tranexamic acid (CYKLOKAPRON) 1,000 mg in 0.9% sodium chloride (MBP/ADV) 50 mL IVPB  1 g IntraVENous ONCE Steven Nielsen MD        cloNIDine (PF) 80 mcg, EPINEPHrine HCl (PF) 0.5 mg, ropivacaine (PF) 5 mg/mL (0.5 %) 150 mg, ketorolac 30 mg, 0.9% sodium chloride 17.7 mL solution   Intra artICUlar ONCE Steven Nielsen MD        tranexamic acid (CYKLOKAPRON) 1,000 mg in 0.9% sodium chloride 50 mL IVPB  1 g IntraVENous ONCE Syed Lemons MD  fentaNYL citrate (PF) 50 mcg/mL injection             dexamethasone (PF) (DECADRON) 10 mg/mL injection             bupivacaine (PF) (MARCAINE) 0.5 % (5 mg/mL) injection             sodium chloride (NS) flush 5-40 mL  5-40 mL IntraVENous Q8H Akosua Clay MD        sodium chloride (NS) flush 5-40 mL  5-40 mL IntraVENous PRN Davian Fernández MD        lidocaine (PF) (XYLOCAINE) 10 mg/mL (1 %) injection 0.1 mL  0.1 mL SubCUTAneous PRN Davian Fernández MD        fentaNYL citrate (PF) injection 50 mcg  50 mcg IntraVENous PRN Davian Fernández MD        midazolam (VERSED) injection 1 mg  1 mg IntraVENous PRN Davian Fernández MD            Allergies   Allergen Reactions    Adhesive Rash        Review of Systems:  Patient is reen. ×3    Objective: Intake and Output:      Physical Exam:   -Mild tenderness mostly on the lateral aspect of the knee  -Mild valgus deformity  -Knee range of motion 0-115  -Mild valgus laxity  -Dorsalis pedis and posterior tibial +1  -Sensation intact  -Toes movement full    Data Review:   Hemoglobin was 12.3 on 9/27/2021  Renal function in reasonable limit  Negative urine  Analysis      X-rays were done in clinic which are suggestive of complete bone-on-bone osteoarthritis of lateral and patellofemoral compartment. Large osteophytes present. Valgus deformity present. Overall Kellgren-Jhonny grade 4 osteoarthritis of the left knee    Assessment:     Active Problems:    Osteoarthritis (10/11/2021)    Severe osteoarthritis of left knee    Plan:     -X-rays and clinical findings were discussed in the clinic.   Discussion done again today.  -Patient is here in the holding area and ready for surgery  -Detailed discussion was done regarding risk and benefits and possible complications including but not limited to infection, wound healing complication, stiffness of the knee, nerve or vessels injury, tendon injury, blood clot in lungs or legs and implant related complications which might require subsequent surgery were discussed  -Patient understood discussion and agreed to proceed with surgery.   We will do tranexamic acid to minimize perioperative blood loss and aspirin will be done for DVT prophylaxis  Proceed with left total knee arthroplasty-

## 2021-10-11 NOTE — ANESTHESIA PREPROCEDURE EVALUATION
Relevant Problems   No relevant active problems       Anesthetic History   No history of anesthetic complications            Review of Systems / Medical History  Patient summary reviewed, nursing notes reviewed and pertinent labs reviewed    Pulmonary        Sleep apnea           Neuro/Psych   Within defined limits           Cardiovascular              Hyperlipidemia    Exercise tolerance: >4 METS     GI/Hepatic/Renal  Within defined limits              Endo/Other      Hypothyroidism  Obesity and arthritis     Other Findings            Past Medical History:   Diagnosis Date    Arthritis     History of colonic polyps     Hyperlipidemia     Sleep apnea     Thyroid disease     hypothyroidism       Past Surgical History:   Procedure Laterality Date    HX COLONOSCOPY      HX HYSTERECTOMY      HX HYSTEROSCOPY      HX ORTHOPAEDIC      lumbar    [place] ABDOMEN SURGERY PROC UNLISTED      hiatal hernia       Current Outpatient Medications   Medication Instructions    aspirin delayed-release 81 mg, Oral, DAILY    atorvastatin (LIPITOR) 20 mg, Oral, DAILY    cyanocobalamin 2,000 mcg, Oral, DAILY    levothyroxine (SYNTHROID) 125 mcg, Oral, DAILY BEFORE BREAKFAST       Current Facility-Administered Medications   Medication Dose Route Frequency    lactated Ringers infusion  20 mL/hr IntraVENous CONTINUOUS    ceFAZolin (ANCEF) 3 g in sterile water (preservative free) 30 mL IV syringe  3 g IntraVENous ONCE    tranexamic acid (CYKLOKAPRON) 1,000 mg in 0.9% sodium chloride (MBP/ADV) 50 mL IVPB  1 g IntraVENous ONCE    cloNIDine (PF) 80 mcg, EPINEPHrine HCl (PF) 0.5 mg, ropivacaine (PF) 5 mg/mL (0.5 %) 150 mg, ketorolac 30 mg, 0.9% sodium chloride 17.7 mL solution   Intra artICUlar ONCE    tranexamic acid (CYKLOKAPRON) 1,000 mg in 0.9% sodium chloride 50 mL IVPB  1 g IntraVENous ONCE    fentaNYL citrate (PF) 50 mcg/mL injection        dexamethasone (PF) (DECADRON) 10 mg/mL injection        bupivacaine (PF) (MARCAINE) 0.5 % (5 mg/mL) injection           Patient Vitals for the past 24 hrs:   Temp Pulse Resp BP SpO2   10/11/21 0650  (!) (P) 55 (P) 14 (!) (P) 133/90 (P) 99 %   10/11/21 0607 36.4 °C (97.6 °F) 60 18 114/77 100 %       Lab Results   Component Value Date/Time    WBC 7.8 09/27/2021 10:45 AM    HGB 12.3 09/27/2021 10:45 AM    HCT 37.5 09/27/2021 10:45 AM    PLATELET 163 95/98/9874 10:45 AM    MCV 78.0 (L) 09/27/2021 10:45 AM     Lab Results   Component Value Date/Time    Sodium 139 09/27/2021 10:45 AM    Potassium 3.5 09/27/2021 10:45 AM    Chloride 105 09/27/2021 10:45 AM    CO2 27 09/27/2021 10:45 AM    Anion gap 7 09/27/2021 10:45 AM    Glucose 104 (H) 09/27/2021 10:45 AM    BUN 16 09/27/2021 10:45 AM    Creatinine 0.67 09/27/2021 10:45 AM    BUN/Creatinine ratio 24 (H) 09/27/2021 10:45 AM    GFR est AA >60 09/27/2021 10:45 AM    GFR est non-AA >60 09/27/2021 10:45 AM    Calcium 9.0 09/27/2021 10:45 AM     No results found for: APTT, PTP, INR, INREXT  Lab Results   Component Value Date/Time    Glucose 104 (H) 09/27/2021 10:45 AM     Physical Exam    Airway  Mallampati: I  TM Distance: 4 - 6 cm  Neck ROM: normal range of motion   Mouth opening: Normal     Cardiovascular    Rhythm: regular  Rate: normal         Dental    Dentition: Edentulous     Pulmonary  Breath sounds clear to auscultation               Abdominal  GI exam deferred       Other Findings            Anesthetic Plan    ASA: 2  Anesthesia type: spinal      Post-op pain plan if not by surgeon: regional and peripheral nerve block single    Induction: Intravenous  Anesthetic plan and risks discussed with: Patient and Family      Benefits and risks of spinal anesthesia discussed with patient/family. All questions answered.

## 2021-10-11 NOTE — PROGRESS NOTES
Patient attended joint class today for the 0930 session. Patient was given a joint book for reference and guided through the book on surgery expectations. Patient was directed to call the number, send an email, or view the website provided if any questions arise prior to surgery.

## 2021-10-11 NOTE — PROGRESS NOTES
PHYSICAL THERAPY EVALUATION  Patient: Jenaro Painting (59 y.o. female)  Date: 10/11/2021  Primary Diagnosis: Osteoarthritis of left knee, unspecified osteoarthritis type [M17.12]  Osteoarthritis [M19.90]  Procedure(s) (LRB):  LEFT TOTAL KNEE ARTHROPLASTY (GENERAL WITH SPINAL) (Left) Day of Surgery   Precautions: WBAT L LE , falls      ASSESSMENT  This is a 67 y/o female , admitted to [facility] on 10[facility]11[facility]21  with c/o osteoarthritis of L knee , s/p L total knee arthroplasty  on 10/11/21 . Pt has PMH of arthritis , colonics polyps , hyperlipidemia , sleep apnea and thyroid disease . Pt received sitting on the recliner with b/l Le elevated  , agreeable for PT eval/tx . Pt is A& O x 4 ,  per pt report , she lives with spouse in a 1 story home with 4 steps (in the back ) to enter , HR on bilateral sides ,  was IND with ADL's and IND for functional transfers/mobility with no AD prior to admission . Based on the objective data described below, the patient presents with AROM at knee with  95 degree flexion  in sitting position , no extension lag , decreased strength , 3+/5 grossly in L Le , R LE - WFL  , balance deficits , generalized weakness , , decreased activity tolerance and increased need for assist with functional mobility ( intact static sitting balance ,  CGA/Swetha for sit <>stand , CGA for SPT , demonstrates good static  standing balance  with support, is able to ambulate - 36' with RW, CGA with slow emily . Demonstrates step to gait with decreased step length  b/l Le ) . Pt educated on rosibel L E HEP to increase ROM, strength and endurance with pt demonstrating and verbalizing understanding. Pt would benefit from continued skilled PT services to address current impairments and improve overall IND and safety with  functional transfers/mobility. Recommend d/c to home with HHPT and family care  when medically appropriate .      Current Level of Function Impacting Discharge (mobility/balance): Requires CGA/Swetha  for transfers and CGA for ambulation . Functional Outcome Measure: The patient scored 18 on the AM PAC basic mobility outcome measure which is indicative of medium complexity. Other factors to consider for discharge: family support , PLOF        PLAN :  Recommendations and Planned Interventions: bed mobility training, transfer training, gait training, therapeutic exercises, neuromuscular re-education, patient and family training/education, and therapeutic activities      Frequency/Duration: Patient will be followed by physical therapy:  3-5x/week to address goals. Recommendation for discharge: (in order for the patient to meet his/her long term goals)  Home with [address] [place] [address]    This discharge recommendation:  Has been made in collaboration with the attending provider and/or case management    IF patient discharges home will need the following DME: rolling walker         SUBJECTIVE:   Patient stated I didn't need any help at home      OBJECTIVE DATA SUMMARY:   HISTORY:    Past Medical History:   Diagnosis Date    Arthritis     History of colonic polyps     Hyperlipidemia     Sleep apnea     Thyroid disease     hypothyroidism     Past Surgical History:   Procedure Laterality Date    HX COLONOSCOPY      HX HYSTERECTOMY      HX HYSTEROSCOPY      HX ORTHOPAEDIC      lumbar    [place] ABDOMEN SURGERY PROC UNLISTED      hiatal hernia       Personal factors and/or comorbidities impacting plan of care:     Home Situation  Home Environment: Private residence  # Steps to Enter: 4 (4 steps in back )  Rails to Enter: Yes  Hand Rails : Bilateral  Wheelchair Ramp: No  One/Two Story Residence: One story  Living Alone: No  Support Systems: Spouse/Significant Other  Current DME Used/Available at Home: None    PLOF: Pt IND for ADLS/IADLS, IND with mobility prior to admission.      EXAMINATION/PRESENTATION/DECISION MAKING:   Critical Behavior:  Neurologic State: Alert  Orientation Level: Oriented X4  Cognition: Follows commands     Hearing:     Skin:  dressing on L knee    Range Of Motion:  AROM: Generally decreased, functional       Strength:    Strength: Generally decreased, functional (3+/5 grossly for L LE , R Le - WFL)     Tone & Sensation:   Tone: Normal    Sensation: Impaired (numbness in L Le )  Functional Mobility:  Transfers:  Sit to Stand: Contact guard assistance;Minimum assistance  Stand to Sit: Contact guard assistance  Stand Pivot Transfers: Contact guard assistance     Balance:   Sitting: Intact; Without support  Standing: Impaired; Without support  Standing - Static: Good;Constant support  Standing - Dynamic : Fair;Constant support  Ambulation/Gait Training:  Distance (ft): 40 Feet (ft)  Assistive Device: Walker, rolling  Ambulation - Level of Assistance: Contact guard assistance  Gait Abnormalities: Step to gait  Speed/Melita: Slow  Step Length: Right shortened;Left shortened    Functional Measure:    [address] [place] [id number] AM-PAC 6 Clicks         Basic Mobility Inpatient Short Form  How much difficulty does the patient currently have. .. Unable A Lot A Little None   1. Turning over in bed (including adjusting bedclothes, sheets and blankets)? [] 1   [] 2   [x] 3   [] 4   2. Sitting down on and standing up from a chair with arms ( e.g., wheelchair, bedside commode, etc.)   [] 1   [] 2   [x] 3   [] 4   3. Moving from lying on back to sitting on the side of the bed? [] 1   [] 2   [x] 3   [] 4          How much help from another person does the patient currently need. .. Total A Lot A Little None   4. Moving to and from a bed to a chair (including a wheelchair)? [] 1   [] 2   [x] 3   [] 4   5. Need to walk in hospital room? [] 1   [] 2   [x] 3   [] 4   6. Climbing 3-5 steps with a railing? [] 1   [] 2   [x] 3   [] 4   © 2007, Trustees of [address] [place] [id number], under license to [employer].  All rights reserved     Score:  Initial:18 Most Recent: X (Date: --10/11/21 )   Interpretation of Tool: Represents activities that are increasingly more difficult (i.e. Bed mobility, Transfers, Gait). Score 24 23 22-20 19-15 14-10 9-7 6   Modifier CH CI CJ CK CL CM CN          Physical Therapy Evaluation Charge Determination   History Examination Presentation Decision-Making   MEDIUM  Complexity : 1-2 comorbidities / personal factors will impact the outcome/ POC  MEDIUM Complexity : 3 Standardized tests and measures addressing body structure, function, activity limitation and / or participation in recreation  LOW Complexity : Stable, uncomplicated  Other Functional Measure [facility] 6 medium complexity      Based on the above components, the patient evaluation is determined to be of the following complexity level: LOW     Pain Ratin/10    Activity Tolerance:   Good  Please refer to the flowsheet for vital signs taken during this treatment. After treatment patient left in no apparent distress:   Sitting in chair, Call bell within reach and Caregiver / family present    COMMUNICATION/EDUCATION:   The patients plan of care was discussed with: Registered nurse. Fall prevention education was provided and the patient/caregiver indicated understanding. and Patient/family agree to work toward stated goals and plan of care. Problem: Mobility Impaired (Adult and Pediatric)  Goal: *Acute Goals and Plan of Care (Insert Text)  Description: Pt will be I with LE HEP in 7 days. Pt will perform bed mobility with mod I in 7 days. Pt will perform transfers with mod I in 7 days. Pt will amb >150 feet with LRAD safely with mod I in 7 days. Pt will ascend/descend 4 steps with B handrails and mod I in 7 days to safely enter home.       Outcome: Not Met       Thank you for this referral.  Lindsey Mirza   Time Calculation: 32 mins

## 2021-10-11 NOTE — PROGRESS NOTES
Reason for Admission:  Osteoarthritis of left knee, unspecified osteoarthritis type, Osteoarthritis                     RUR Score:  na                   Plan for utilizing home health: Will need home health    PCP: First and Last name:  Alina Knox MD     Name of Practice:    Are you a current patient: Yes/No: yes   Approximate date of last visit: October 04, 2021   Can you participate in a virtual visit with your PCP: yes                    Current Advanced Directive/Advance Care Plan: No Order      Healthcare Decision Maker:     Fazal Class [phone]    Click here to complete [id number] including selection of the Healthcare Decision Maker Relationship (ie \"Primary\")                             Transition of Care Plan:      Met with patient at bedside. Lives in 1 story house with her . Does not use any DMEs but will need a rolling walker at discharge. Drives her self but will need assistance for transportation from her . Does all her own ADLs but will most likely need PT and OT home health at discharge. Choice obtains for no preference just \"someone who is good\". Will need rolling walker as well. Referral sent out through [employer].     [place] plan is home with home pawel naranjo rolling walker

## 2021-10-11 NOTE — OP NOTES
Name: Jenaro Painting    MRN: [id number]    Date: 10/11/21    Surgeon: Miracle Tamez MD    Preoperative diagnosis: Severe osteoarthritis of left knee    Postoperative diagnosis: Severe osteoarthritis of left knee    Operative procedure: Left Total knee arthroplasty    Assistants:  1. Or scrub tech  2.  Surgical 1st assist    Anesthesia: Spinal + adductor canal block    Complications: None    Estimated blood loss: 50 cc     Drain: None    Specimen: None    Implants:   - Esanexuy waygum   Femoral component - Size 8 posterior stabilized  Tibial component - size 6  Polyethylene insert - size 8X6  Patellar component -size 41    Indication: 66 y.o. y/o female Patient had chronic knee pain. She was treated with oral pain medication, anti-inflammatory medication, programmed knee exercises and intra-articular cortisone injections. Once all conservative treatment was exhausted, we discussed about knee replacement surgery. Risk and benefits were discussed. Possible complications including but not limited to infection, blood clot, stiffness of the knee, fracture, nerve or vessel injury, tendon injury and implants loosening which might require subsequent surgery were discussed. More serious complications including blood clot in the lungs or cardiac arrest which may lead to death were also discussed. Procedure detail:   Patient as well as left lower extremity was identified and marked in the holding area and then patient was brought to the operating room. Spinal and adductor canal block Anesthesia was induced by anesthesia provider  and then standard prepping and draping was done. Timeout was completed. 3 Gram of Ancef was done within 30 minutes of procedure. 1 g of tranexamic acid was infused before incision. Tourniquet was inflated to 225 mmHg. Now standard midline incision was made. Dissection was carried up to the extensor mechanism. Medial parapatellar arthrotomy was done.   Medial banana peel was done to have proper exposure. Meniscotibial ligament was released medially. Now knee was flexed and intramedullary drilling was done for the femur. Distal femoral cutting guide was inserted and knee was cut for 9 mm distal cut at 5 degrees of valgus. Now attention was drawn to proximal tibia. Extra medullary guide was used and proximal tibia resection was done for 2 mm from worn out tibial condyle. Now extension gap was checked and then necessary lateral release was performed to achieve rectangular gap. Now [employer] as well as transepicondylar lines were drawn and then femoral sizing guide was applied. Once femur size mentioned above was decided, 4-in-1 cutting block was applied and then remaining for cuts were made on distal femur. Box cut was performed. Now knee was flexed at 90 and lamina  was used. Remaining menisci as well as posterior osteophytes were removed. Posterior capsule was injected with pain cocktail. Now trial femur was applied and then different size trial inserts were used. With above-mentioned size liner, knee was stable in varus valgus stress throughout the knee range of motion. Patella was tracking excellent. Knee flexion was from 0-120. Now all trial components were removed. Plenty of irrigation was done and cut bony surfaces were prepped for cementing. Cement was mixed on the back table and then above-mentioned sized real implants were inserted. Knee was placed in extension and cement was allowed to be dried. Meanwhile remaining pain cocktail injection was injected in periarticular tissues. Repeat irrigation was performed. Once cement was dry, tourniquet was deflated and bleeders were coagulated. Another gram of tranexamic acid was infused. Now closure was done with #1 Vicryl and #2 strata fix for arthrotomy closure. Subcutaneous tissues were closed with 2-0 Vicryl and 3-0 Monocryl. Pruneo dressing was applied. Ace bandage was applied.     Patient was brought to recovery room in stable condition. Recovery room neurovascular status was intact. I was scrubbed in entire case and performed all the steps by myself.     Jennifer Lazcano MD

## 2021-10-11 NOTE — ROUTINE PROCESS
Bedside shift report given to Christy Horta RN on 4S using SBAR and I trace. Patient in no acute distress. Family present with permission from patient. Belongings at the bedside. Procedure summary, medications, care plan, and LDAs reviewed. Opportunity given for questions and clarifications. Transfer of care complete at 1120.

## 2021-10-12 VITALS
TEMPERATURE: 97.3 F | HEART RATE: 59 BPM | SYSTOLIC BLOOD PRESSURE: 122 MMHG | RESPIRATION RATE: 16 BRPM | OXYGEN SATURATION: 98 % | DIASTOLIC BLOOD PRESSURE: 71 MMHG

## 2021-10-12 LAB
ANION GAP SERPL CALC-SCNC: 8 MMOL/L (ref 5–15)
BUN SERPL-MCNC: 23 MG/DL (ref 6–20)
BUN/CREAT SERPL: 25 (ref 12–20)
CA-I BLD-MCNC: 8.3 MG/DL (ref 8.5–10.1)
CHLORIDE SERPL-SCNC: 106 MMOL/L (ref 97–108)
CO2 SERPL-SCNC: 28 MMOL/L (ref 21–32)
CREAT SERPL-MCNC: 0.93 MG/DL (ref 0.55–1.02)
GLUCOSE SERPL-MCNC: 140 MG/DL (ref 65–100)
HGB BLD-MCNC: 11.1 G/DL (ref 11.5–16)
POTASSIUM SERPL-SCNC: 3.7 MMOL/L (ref 3.5–5.1)
SODIUM SERPL-SCNC: 142 MMOL/L (ref 136–145)

## 2021-10-12 PROCEDURE — 74011000250 HC RX REV CODE- 250: Performed by: ORTHOPAEDIC SURGERY

## 2021-10-12 PROCEDURE — 97110 THERAPEUTIC EXERCISES: CPT

## 2021-10-12 PROCEDURE — 85018 HEMOGLOBIN: CPT

## 2021-10-12 PROCEDURE — 36415 COLL VENOUS BLD VENIPUNCTURE: CPT

## 2021-10-12 PROCEDURE — 97530 THERAPEUTIC ACTIVITIES: CPT

## 2021-10-12 PROCEDURE — 97165 OT EVAL LOW COMPLEX 30 MIN: CPT

## 2021-10-12 PROCEDURE — 80048 BASIC METABOLIC PNL TOTAL CA: CPT

## 2021-10-12 PROCEDURE — 97116 GAIT TRAINING THERAPY: CPT

## 2021-10-12 PROCEDURE — 74011250636 HC RX REV CODE- 250/636: Performed by: ORTHOPAEDIC SURGERY

## 2021-10-12 PROCEDURE — 74011250637 HC RX REV CODE- 250/637: Performed by: ORTHOPAEDIC SURGERY

## 2021-10-12 RX ORDER — ASPIRIN 325 MG
325 TABLET, DELAYED RELEASE (ENTERIC COATED) ORAL 2 TIMES DAILY
Qty: 42 TABLET | Refills: 0 | Status: SHIPPED | OUTPATIENT
Start: 2021-10-12 | End: 2021-11-02

## 2021-10-12 RX ORDER — ACETAMINOPHEN 500 MG
1000 TABLET ORAL
Qty: 30 TABLET | Refills: 0 | Status: SHIPPED
Start: 2021-10-12

## 2021-10-12 RX ORDER — TRAMADOL HYDROCHLORIDE 50 MG/1
50 TABLET ORAL
Qty: 20 TABLET | Refills: 0 | Status: SHIPPED | OUTPATIENT
Start: 2021-10-12 | End: 2021-10-17

## 2021-10-12 RX ADMIN — ACETAMINOPHEN 650 MG: 325 TABLET ORAL at 00:16

## 2021-10-12 RX ADMIN — ASPIRIN 325 MG: 325 TABLET, COATED ORAL at 08:25

## 2021-10-12 RX ADMIN — TRAMADOL HYDROCHLORIDE 50 MG: 50 TABLET, FILM COATED ORAL at 05:44

## 2021-10-12 RX ADMIN — ACETAMINOPHEN 650 MG: 325 TABLET ORAL at 12:52

## 2021-10-12 RX ADMIN — TRAMADOL HYDROCHLORIDE 50 MG: 50 TABLET, FILM COATED ORAL at 12:52

## 2021-10-12 RX ADMIN — LEVOTHYROXINE SODIUM 125 MCG: 0.03 TABLET ORAL at 07:08

## 2021-10-12 RX ADMIN — Medication 10 ML: at 05:45

## 2021-10-12 RX ADMIN — TRAMADOL HYDROCHLORIDE 50 MG: 50 TABLET, FILM COATED ORAL at 00:16

## 2021-10-12 RX ADMIN — ATORVASTATIN CALCIUM 20 MG: 20 TABLET, FILM COATED ORAL at 08:25

## 2021-10-12 RX ADMIN — CELECOXIB 200 MG: 200 CAPSULE ORAL at 08:25

## 2021-10-12 RX ADMIN — CEFAZOLIN 3 G: 1 INJECTION, POWDER, FOR SOLUTION INTRAMUSCULAR; INTRAVENOUS at 00:16

## 2021-10-12 RX ADMIN — ACETAMINOPHEN 650 MG: 325 TABLET ORAL at 05:44

## 2021-10-12 NOTE — PROGRESS NOTES
Problem: Mobility Impaired (Adult and Pediatric)  Goal: *Acute Goals and Plan of Care (Insert Text)  Description: Pt will be I with LE HEP in 7 days. Pt will perform bed mobility with mod I in 7 days. Pt will perform transfers with mod I in 7 days. Pt will amb >150 feet with LRAD safely with mod I in 7 days. Pt will ascend/descend 4 steps with B handrails and mod I in 7 days to safely enter home. 10/12/2021 1545 by Elan Resendiz  Outcome: Progressing Towards Goal  10/12/2021 1152 by Elan Resendiz  Outcome: Progressing Towards Goal   PHYSICAL THERAPY TREATMENT  Patient: Dorie Felipe (61 y.o. female)  Date: 10/12/2021  Diagnosis: Osteoarthritis of left knee, unspecified osteoarthritis type [M17.12]  Osteoarthritis [M19.90] <principal problem not specified>  Procedure(s) (LRB):  LEFT TOTAL KNEE ARTHROPLASTY (GENERAL WITH SPINAL) (Left) 1 Day Post-Op  Precautions:    Chart, physical therapy assessment, plan of care and goals were reviewed. ASSESSMENT  Patient continues with skilled PT services and is progressing towards goals. Pt. Improves with mobility each session. Pt. Ambulated to the PT gym for stair training and exercises. Gt. Steady with no LOB. Pt. Up/down stairs with using bilat rails and CGA X 1. Pt. Was able to follow HEP with a few verbal cues. Current Level of Function Impacting Discharge (mobility/balance): Pt. Will need assist at home. Other factors to consider for discharge: None         PLAN :  Patient continues to benefit from skilled intervention to address the above impairments. Continue treatment per established plan of care. to address goals.     Recommendation for discharge: (in order for the patient to meet his/her long term goals)  Home with [address] [place] [address]    This discharge recommendation:  Has been made in collaboration with the attending provider and/or case management    IF patient discharges home will need the following DME: rolling walker SUBJECTIVE:   Patient stated I am doing good. \" \"I'm ready to go home. \"    OBJECTIVE DATA SUMMARY:   Critical Behavior:  Neurologic State: Alert  Orientation Level: Oriented X4  Cognition: Follows commands, Appropriate decision making     Functional Mobility Training:  Bed Mobility:      Pt. Sitting in chair upon arrival. Pt. Ambulated to PT gym. Scooting: Stand-by assistance        Transfers:  Sit to Stand: Stand-by assistance  Stand to Sit: Stand-by assistance        Bed to Chair: Stand-by assistance                    Balance:  Sitting: Intact; Without support  Standing: Impaired; With support  Standing - Static: Good  Standing - Dynamic : Fair  Ambulation/Gait Training:  Distance (ft): 100 Feet (ft)  Assistive Device: Walker, rolling  Ambulation - Level of Assistance: Contact guard assistance              Left Side Weight Bearing: As tolerated        Speed/Melita: Slow  Step Length: Right shortened;Left shortened        Stairs:  Number of Stairs Trained: 4  Stairs - Level of Assistance: Contact guard assistance;Stand-by assistance   Rail Use: Both                      Placed ice pack on L knee with a 5lb. Weight for stretch X 15 min. Therapeutic Exercises: Therapeutic Exercises:       EXERCISE   Sets   Reps   Active Active Assist   Passive Self ROM   Comments   Ankle Pumps  20 [x] [] [] [] L LE   Quad Sets/Glut Sets  20 [x] [] [] []    Hamstring Sets   [] [] [] []    Short Arc Quads  20 [x] [] [] [] L LE   Heel Slides  20 [x] [] [] [] L LE with blue band   Straight Leg Raises  20 [x] [] [] [] L LE   Hip abd/add  20 [x] [] [] [] L LE   Long Arc Quads   [] [] [] []    Marching   [] [] [] []       [] [] [] []     Flexion 96 degree  Pt. Not lacking extension. Pain Ratin/10      Activity Tolerance:   Good  Please refer to the flowsheet for vital signs taken during this treatment.     After treatment patient left in no apparent distress:   Sitting in chair, Call bell within reach, and Caregiver / family present    COMMUNICATION/COLLABORATION:   The patients plan of care was discussed with: Registered nurse.      Ivan South   Time Calculation: 38 mins  13:15-13:38 [id number]

## 2021-10-12 NOTE — PROGRESS NOTES
Spoke with patient to let her know she has been accepted by [facility] 10/13/2021. And is awaiting rolling walker to be delivered by [facility]. Once walker is delivered patient can be discharged. Discharge plan of care/case management plan validated with provider discharge order.

## 2021-10-12 NOTE — ROUTINE PROCESS
[facility] BEDSIDE_VERBAL_ shift change report given to Yee Stephens RN (oncoming nurse) by Ashlyn Contreras RN (offgoing nurse).  Report included the following information from the SBAR

## 2021-10-12 NOTE — PROGRESS NOTES
Orthopedic progress note    Date:10/12/2021       Room:[facility]  Patient Name:Sonia Vizcaino     YOB: 1950     Age:71 y.o. Subjective    66year old female status post left total knee arthroplasty. Postop day #1. Patient doing well. She complains of minimal pain of her left knee. She has ambulated with therapy yesterday and tolerated that well. She has had 2 bowel movements since surgery. No other complaints at this time.   Objective           Vitals Last 24 Hours:  TEMPERATURE:  Temp  Av.5 °F (36.4 °C)  Min: 97.3 °F (36.3 °C)  Max: 97.6 °F (36.4 °C)  RESPIRATIONS RANGE: Resp  Avg: 15.7  Min: 12  Max: 18  PULSE OXIMETRY RANGE: SpO2  Av.1 %  Min: 95 %  Max: 100 %  PULSE RANGE: Pulse  Av.9  Min: 57  Max: 73  BLOOD PRESSURE RANGE: Systolic (92ANV), ZND:121 , Min:94 , CXL:323   ; Diastolic (47ISZ), JDR:40, Min:58, Max:87    Current Facility-Administered Medications   Medication Dose Route Frequency    lactated Ringers infusion  20 mL/hr IntraVENous CONTINUOUS    atorvastatin (LIPITOR) tablet 20 mg  20 mg Oral DAILY    levothyroxine (SYNTHROID) tablet 125 mcg  125 mcg Oral ACB    0.9% sodium chloride infusion  125 mL/hr IntraVENous CONTINUOUS    sodium chloride 0.9 % bolus infusion 500 mL  500 mL IntraVENous ONCE PRN    sodium chloride (NS) flush 5-40 mL  5-40 mL IntraVENous Q8H    sodium chloride (NS) flush 5-40 mL  5-40 mL IntraVENous PRN    naloxone (NARCAN) injection 0.4 mg  0.4 mg IntraVENous PRN    senna-docusate (PERICOLACE) 8.6-50 mg per tablet 1 Tablet  1 Tablet Oral BID    polyethylene glycol (MIRALAX) packet 17 g  17 g Oral DAILY    [START ON 10/13/2021] bisacodyL (DULCOLAX) suppository 10 mg  10 mg Rectal DAILY PRN    acetaminophen (TYLENOL) tablet 650 mg  650 mg Oral Q6H    traMADoL (ULTRAM) tablet 50 mg  50 mg Oral Q6H    oxyCODONE IR (ROXICODONE) tablet 5 mg  5 mg Oral Q3H PRN    celecoxib (CELEBREX) capsule 200 mg  200 mg Oral BID    morphine 10 mg/mL injection 2 mg  2 mg IntraVENous Q3H PRN    ondansetron (ZOFRAN) injection 4 mg  4 mg IntraVENous Q4H PRN    aspirin delayed-release tablet 325 mg  325 mg Oral BID    ropivacaine (PF) (NAROPIN) 5 mg/mL (0.5 %) injection    PRN      Review of Systems   Constitutional: Negative for malaise/fatigue. Respiratory: Negative for cough, shortness of breath and wheezing. Cardiovascular: Negative for chest pain and palpitations. Gastrointestinal: Negative for abdominal pain, heartburn, nausea and vomiting. Neurological: Negative for headaches. Musculoskeletal: Denies any numbness/tingling of operative extremity    I/O (24Hr): Intake/Output Summary (Last 24 hours) at 10/12/2021 0840  Last data filed at 10/11/2021 1005  Gross per 24 hour   Intake 400 ml   Output    Net 400 ml     Objective  Labs/Imaging/Diagnostics    Labs:  CBC:  Recent Labs     10/12/21  0604   HGB 11.1*     CHEMISTRIES:  Recent Labs     10/12/21  0604      K 3.7      CO2 28   BUN 23*   CREA 0.93   CA 8.3*   PT/INR:No results for input(s): INR, INREXT in the last 72 hours. No lab exists for component: PROTIME  APTT:No results for input(s): APTT in the last 72 hours. LIVER PROFILE:No results for input(s): AST, ALT in the last 72 hours. No lab exists for component: BILIDIR, BILITOT, ALKPHOS  No results found for: ALT, AST, GGT, GGTP, AP, APIT, APX, CBIL, TBIL, TBILI    Physical Exam:  Left knee: Dressing clean dry and intact. No swelling seen left lower extremity. Sensation intact throughout left lower extremity. No calf pain to palpation. DP/PT pulses are faint but palpable bilaterally. Cap refill is 2 seconds. EHL/DF/PF is 4 out of 5. Negative Homans. Left lower extremity neurovascularly intact. Assessment//Plan           Patient Active Problem List    Diagnosis Date Noted    Osteoarthritis 10/11/2021     Status post left total knee arthroplasty. Postop day #1. Continue with therapy.   Continue with aspirin for DVT prophylaxis. Spirometer and exercises encouraged. Plan to discharge home today.       Electronically signed by Mina Murphy PA-C on 10/12/2021 at 8:40 AM

## 2021-10-12 NOTE — PROGRESS NOTES
Patient has been accepted with [facility] with a start of care of 10/13/2021. Discharge plan of care/case management plan validated with provider discharge order.

## 2021-10-12 NOTE — PROGRESS NOTES
Patient given discharge instructions and verbalizes understanding. IV removed with no complications. Patient remains stable at this time. Patient discharged via wheelchair with family.

## 2021-10-12 NOTE — PROGRESS NOTES
OCCUPATIONAL THERAPY EVALUATION  Patient: Clauyd Brewster (33 y.o. female)  Date: 10/12/2021  Primary Diagnosis: Osteoarthritis of left knee, unspecified osteoarthritis type [M17.12]  Osteoarthritis [M19.90]  Procedure(s) (LRB):  LEFT TOTAL KNEE ARTHROPLASTY (GENERAL WITH SPINAL) (Left) 1 Day Post-Op   Precautions: fall risk, WBAT R LE      ASSESSMENT  Pt is a 67 y/o F with PMH listed below presenting to [facility] with primary dx of OA L knee, s/p elective L TKA with Dr. Deana Martinez on 10/11/21. Pt has orders to be WBAT L LE. Pt received sitting up in recliner chair upon arrival, AXO x4, and agreeable to OT evaluation at this time. Spouse also present at bedside with pt permission. Per pt report, pt lives with spouse in a one-story home with 4 EULA and B HR, was IND with ADLs/IADLs and ambulatory without AD at [facility]. Other DME owned includes: [facility]      Based on current observations, pt presents with deficits in generalized strength/AROM (L LE), static/dynamic standing balance, and functional activity tolerance impacting overall performance of ADLs and functional transfers/mobility. Pt educated on WB status and compensatory dressing techniques with good understanding verbalized prior to activity. Scooting completed with SBA in in chair and pt doffs socks/dons sneakers s/p setup including managing laces with good anterior reach. STS completed to/from chair and toilet with CGA with good safety awareness noted with hand placement and good eccentric control with transfers. Basic grooming (hand washing) performed standing sink side SBA with no LOB noted. Pt returned to chair at end of session, left resting comfortably with B LE elevated and ice pack to L knee. Overall, pt tolerates session well today, pt/spouse educated on home safety with good understanding verbalized.  Pt would benefit from continued skilled OT services to address current impairments and improve overall IND and safety with self cares and functional transfers/mobility. Recommend discharge home with [address] [place] [address], family care, and  once medically appropriate. Other factors to consider for discharge: good family support, DME, time since onset, severity of deficits      Patient will benefit from skilled therapy intervention to address the above noted impairments. PLAN :  Recommendations and Planned Interventions: self care training, functional mobility training, therapeutic exercise, balance training, therapeutic activities, endurance activities, patient education, home safety training, and family training/education    Frequency/Duration: Patient will be followed by physical therapy:  3-5x/week to address goals. Recommendation for discharge: (in order for the patient to meet his/her long term goals)  Home with [address] [place] [address]    This discharge recommendation:  Has been made in collaboration with the attending provider and/or case management    IF patient discharges home will need the following DME: walker: rolling       SUBJECTIVE:   Patient stated I am doing well.     OBJECTIVE DATA SUMMARY:   HISTORY:   Past Medical History:   Diagnosis Date    Arthritis     History of colonic polyps     Hyperlipidemia     Sleep apnea     Thyroid disease     hypothyroidism     Past Surgical History:   Procedure Laterality Date    HX COLONOSCOPY      HX HYSTERECTOMY      HX HYSTEROSCOPY      HX ORTHOPAEDIC      lumbar    AL ABDOMEN SURGERY PROC UNLISTED      hiatal hernia       Expanded or extensive additional review of patient history:     Home Situation  Home Environment: Private residence  # Steps to Enter: 4  Rails to Enter: Yes  Hand Rails : Bilateral  Wheelchair Ramp: No  One/Two Story Residence: One story  Living Alone: No  Support Systems: Spouse/Significant Other  Patient Expects to be Discharged to[de-identified] House  Current DME Used/Available at Home: Cane, straight  Tub or Shower Type:  Other (comment) (walk in shower)    EXAMINATION OF PERFORMANCE DEFICITS:  Cognitive/Behavioral Status:  Neurologic State: Alert  Orientation Level: Oriented X4  Cognition: Follows commands; Appropriate decision making    Hearing: Auditory  Auditory Impairment: None    Vision/Perceptual:     WFL      Range of Motion:  AROM: Within functional limits    Strength:  Strength: Within functional limits    Coordination:  Coordination: Within functional limits  Fine Motor Skills-Upper: Left Intact; Right Intact    Gross Motor Skills-Upper: Left Intact; Right Intact    Tone & Sensation:  Tone: Normal  Sensation: Intact    Balance:  Sitting: Intact; Without support  Standing: Intact; With support  Standing - Static: Good  Standing - Dynamic : Fair;Occasional    Functional Mobility and Transfers for ADLs:  Bed Mobility:  Scooting: Stand-by assistance    Transfers:  Sit to Stand: Contact guard assistance  Stand to Sit: Contact guard assistance  Bed to Chair: Contact guard assistance  Bathroom Mobility: Contact guard assistance  Toilet Transfer : Contact guard assistance    ADL Intervention and task modifications:  Grooming  Grooming Assistance: Stand-by assistance  Position Performed: Standing  Washing Hands: Stand-by assistance    Lower Body Dressing Assistance  Socks: Stand-by assistance  Shoes with Elastic Laces: Set-up; Stand-by assistance    Functional Measure:    [address] [facility] [address] [address] AM-PACTM \"6 Clicks\"                                                       Daily Activity Inpatient Short Form  How much help from another person does the patient currently need. .. Total; A Lot A Little None   1. Putting on and taking off regular lower body clothing? []  1 []  2 [x]  3 []  4   2. Bathing (including washing, rinsing, drying)? []  1 []  2 [x]  3 []  4   3. Toileting, which includes using toilet, bedpan or urinal? [] 1 []  2 []  3 [x]  4   4. Putting on and taking off regular upper body clothing? []  1 []  2 []  3 [x]  4   5. Taking care of personal grooming such as brushing teeth?  []  1 []  2 []  3 [x]  4   6. Eating meals? []  1 []  2 []  3 [x]  4   © , Trustees of [address] [place] [address] [address] [address], under license to [employer]. All rights reserved     Score: 22/24     Interpretation of Tool:  Represents clinically-significant functional categories (i.e. Activities of daily living). Percentage of Impairment CH    0%   CI    1-19% CJ    20-39% CK    40-59% CL    60-79% CM    80-99% CN     100%   [facility]  Score 6-24 24 23 20-22 15-19 10-14 7-9 6        Occupational Therapy Evaluation Charge Determination   History Examination Decision-Making   LOW Complexity : Brief history review  LOW Complexity : 1-3 performance deficits relating to physical, cognitive , or psychosocial skils that result in activity limitations and / or participation restrictions  MEDIUM Complexity : Patient may present with comorbidities that affect occupational performnce. Miniml to moderate modification of tasks or assistance (eg, physical or verbal ) with assesment(s) is necessary to enable patient to complete evaluation       Based on the above components, the patient evaluation is determined to be of the following complexity level: LOW   Pain Ratin/10    Activity Tolerance:   Good  Please refer to the flowsheet for vital signs taken during this treatment. After treatment patient left in no apparent distress:    Sitting in chair, Heels elevated for pressure relief, Call bell within reach, and Caregiver / family present    COMMUNICATION/EDUCATION:   The patients plan of care was discussed with: Physical therapist and Registered nurse. Home safety education was provided and the patient/caregiver indicated understanding., Patient/family have participated as able in goal setting and plan of care. , and Patient/family agree to work toward stated goals and plan of care. This patients plan of care is appropriate for delegation to [facility].     Thank you for this referral.  [facility]  Time Calculation: 19 mins   Problem: Self Care Deficits Care Plan (Adult)  Goal: *Acute Goals and Plan of Care (Insert Text)  Description: Pt will be IND sup <> sit in prep for EOB ADLs  Pt will be Mod I grooming standing sink side LRAD  Pt will be Mod I LE dressing sitting EOB/long sit  Pt will be Mod I sit <>  prep for toileting LRAD  Pt will be Mod I toileting/toilet transfer/cloth mgmt LRAD  Pt will be IND following UE HEP in prep for self care tasks   Outcome: Not Met

## 2021-10-12 NOTE — PROGRESS NOTES
Problem: Mobility Impaired (Adult and Pediatric)  Goal: *Acute Goals and Plan of Care (Insert Text)  Description: Pt will be I with LE HEP in 7 days. Pt will perform bed mobility with mod I in 7 days. Pt will perform transfers with mod I in 7 days. Pt will amb >150 feet with LRAD safely with mod I in 7 days. Pt will ascend/descend 4 steps with B handrails and mod I in 7 days to safely enter home. Outcome: Progressing Towards Goal   PHYSICAL THERAPY TREATMENT  Patient: Francisca Vail (1 y.o. female)  Date: 10/12/2021  Diagnosis: Osteoarthritis of left knee, unspecified osteoarthritis type [M17.12]  Osteoarthritis [M19.90] <principal problem not specified>  Procedure(s) (LRB):  LEFT TOTAL KNEE ARTHROPLASTY (GENERAL WITH SPINAL) (Left) 1 Day Post-Op  Precautions:    Chart, physical therapy assessment, plan of care and goals were reviewed. ASSESSMENT  Patient continues with skilled PT services and is progressing towards goals. Improvement noted with mobility each session. Pt. Increased gt. Distance and endurance. Gt. Steady with RW but pt. Had an episode of LOB X 1 while turning with RW and required min assist X 1 for balance. Current Level of Function Impacting Discharge (mobility/balance): Pt. Will need family assistance at home. Other factors to consider for discharge: None         PLAN :  Patient continues to benefit from skilled intervention to address the above impairments. Continue treatment per established plan of care. to address goals. Recommendation for discharge: (in order for the patient to meet his/her long term goals)  Home with [address] [place] [address]    This discharge recommendation:  Has been made in collaboration with the attending provider and/or case management    IF patient discharges home will need the following DME: RW       SUBJECTIVE:   Patient stated I feel pretty good, I'm just a little sore. \"    OBJECTIVE DATA SUMMARY:   Critical Behavior:  Neurologic State: Alert  Orientation Level: Oriented X4  Cognition: Follows commands, Appropriate decision making     Functional Mobility Training:  Bed Mobility:      Pt. Sitting in chair upon arrival.     Scooting: Stand-by assistance        Transfers:  Sit to Stand: Contact guard assistance  Stand to Sit: Contact guard assistance        Bed to Chair: Contact guard assistance                    Balance:  Sitting: Intact; Without support  Standing: Intact; With support  Standing - Static: Good  Standing - Dynamic : Fair;Occasional  Ambulation/Gait Training:  Distance (ft): 175 Feet (ft)  Assistive Device: Walker, rolling  Ambulation - Level of Assistance: Contact guard assistance;Minimal assistance (LOB X 1 for turning with RW and required min assist X 1.)                       Speed/Melita: Slow  Step Length: Right shortened;Left shortened     Pt. Instructed properly on turning with RW. Pt. Performed several times with CGA X 1. Therapeutic Exercises:     Pain Ratin/10      Activity Tolerance:   Good  Please refer to the flowsheet for vital signs taken during this treatment. After treatment patient left in no apparent distress:   Sitting in chair, Call bell within reach, Caregiver / family present, and Notified RN of RX.    COMMUNICATION/COLLABORATION:   The patients plan of care was discussed with: Registered nurseTae [place]   Time Calculation: 18 mins

## 2021-11-24 NOTE — ANESTHESIA POSTPROCEDURE EVALUATION
Procedure(s):  LEFT TOTAL KNEE ARTHROPLASTY (GENERAL WITH SPINAL).     spinal    Anesthesia Post Evaluation      Multimodal analgesia: multimodal analgesia used between 6 hours prior to anesthesia start to PACU discharge  Patient location during evaluation: PACU  Patient participation: complete - patient participated  Level of consciousness: awake  Pain score: 0  Pain management: adequate  Airway patency: patent  Anesthetic complications: no  Cardiovascular status: acceptable  Respiratory status: acceptable  Hydration status: acceptable  Post anesthesia nausea and vomiting:  controlled  Final Post Anesthesia Temperature Assessment:  Normothermia (36.0-37.5 degrees C)      INITIAL Post-op Vital signs:   Vitals Value Taken Time   /58 10/11/21 1120   Temp 36.4 °C (97.5 °F) 10/11/21 1020   Pulse 61 10/11/21 1120   Resp 16 10/11/21 1120   SpO2 96 % 10/11/21 1120

## 2022-03-19 PROBLEM — M19.90 OSTEOARTHRITIS: Status: ACTIVE | Noted: 2021-10-11

## 2023-01-10 ENCOUNTER — HOSPITAL ENCOUNTER (OUTPATIENT)
Dept: PREADMISSION TESTING | Age: 73
Discharge: HOME | End: 2023-01-10
Payer: MEDICARE

## 2023-01-10 VITALS
RESPIRATION RATE: 16 BRPM | HEIGHT: 72 IN | TEMPERATURE: 97.5 F | WEIGHT: 266 LBS | BODY MASS INDEX: 36.03 KG/M2 | HEART RATE: 73 BPM | SYSTOLIC BLOOD PRESSURE: 137 MMHG | OXYGEN SATURATION: 98 % | DIASTOLIC BLOOD PRESSURE: 82 MMHG

## 2023-01-10 LAB
ABO + RH BLD: NORMAL
ANION GAP SERPL CALC-SCNC: 7 MMOL/L (ref 5–15)
APPEARANCE UR: CLEAR
APTT PPP: 35.2 SEC (ref 21.2–34.1)
BACTERIA URNS QL MICRO: NEGATIVE /HPF
BILIRUB UR QL: NEGATIVE
BLOOD GROUP ANTIBODIES SERPL: NEGATIVE
BUN SERPL-MCNC: 22 MG/DL (ref 6–20)
BUN/CREAT SERPL: 31 (ref 12–20)
CA-I BLD-MCNC: 9.2 MG/DL (ref 8.5–10.1)
CHLORIDE SERPL-SCNC: 106 MMOL/L (ref 97–108)
CO2 SERPL-SCNC: 28 MMOL/L (ref 21–32)
COLOR UR: ABNORMAL
CREAT SERPL-MCNC: 0.7 MG/DL (ref 0.55–1.02)
EPITH CASTS URNS QL MICRO: ABNORMAL /LPF
ERYTHROCYTE [DISTWIDTH] IN BLOOD BY AUTOMATED COUNT: 16.7 % (ref 11.5–14.5)
GLUCOSE SERPL-MCNC: 97 MG/DL (ref 65–100)
GLUCOSE UR STRIP.AUTO-MCNC: NEGATIVE MG/DL
HCT VFR BLD AUTO: 35.1 % (ref 35–47)
HGB BLD-MCNC: 11.8 G/DL (ref 11.5–16)
HGB UR QL STRIP: ABNORMAL
INR PPP: 1 (ref 0.9–1.1)
KETONES UR QL STRIP.AUTO: NEGATIVE MG/DL
LEUKOCYTE ESTERASE UR QL STRIP.AUTO: NEGATIVE
MCH RBC QN AUTO: 26 PG (ref 26–34)
MCHC RBC AUTO-ENTMCNC: 33.6 G/DL (ref 30–36.5)
MCV RBC AUTO: 77.3 FL (ref 80–99)
MRSA DNA SPEC QL NAA+PROBE: NOT DETECTED
MUCOUS THREADS URNS QL MICRO: ABNORMAL /LPF
NITRITE UR QL STRIP.AUTO: NEGATIVE
NRBC # BLD: 0 K/UL (ref 0–0.01)
NRBC BLD-RTO: 0 PER 100 WBC
PH UR STRIP: 5 (ref 5–8)
PLATELET # BLD AUTO: 299 K/UL (ref 150–400)
PMV BLD AUTO: 10.4 FL (ref 8.9–12.9)
POTASSIUM SERPL-SCNC: 4.2 MMOL/L (ref 3.5–5.1)
PROT UR STRIP-MCNC: NEGATIVE MG/DL
PROTHROMBIN TIME: 13.6 SEC (ref 11.9–14.6)
RBC # BLD AUTO: 4.54 M/UL (ref 3.8–5.2)
RBC #/AREA URNS HPF: ABNORMAL /HPF (ref 0–5)
SODIUM SERPL-SCNC: 141 MMOL/L (ref 136–145)
SP GR UR REFRACTOMETRY: 1.02 (ref 1–1.03)
SPECIMEN EXP DATE BLD: NORMAL
THERAPEUTIC RANGE,PTTT: ABNORMAL SEC (ref 82–109)
UA: UC IF INDICATED,UAUC: ABNORMAL
UROBILINOGEN UR QL STRIP.AUTO: 0.1 EU/DL (ref 0.1–1)
WBC # BLD AUTO: 7.8 K/UL (ref 3.6–11)
WBC URNS QL MICRO: ABNORMAL /HPF (ref 0–4)

## 2023-01-10 PROCEDURE — 87086 URINE CULTURE/COLONY COUNT: CPT

## 2023-01-10 PROCEDURE — 86900 BLOOD TYPING SEROLOGIC ABO: CPT

## 2023-01-10 PROCEDURE — 85730 THROMBOPLASTIN TIME PARTIAL: CPT

## 2023-01-10 PROCEDURE — 85610 PROTHROMBIN TIME: CPT

## 2023-01-10 PROCEDURE — 81001 URINALYSIS AUTO W/SCOPE: CPT

## 2023-01-10 PROCEDURE — 80048 BASIC METABOLIC PNL TOTAL CA: CPT

## 2023-01-10 PROCEDURE — 85027 COMPLETE CBC AUTOMATED: CPT

## 2023-01-10 PROCEDURE — 36415 COLL VENOUS BLD VENIPUNCTURE: CPT

## 2023-01-10 PROCEDURE — 87641 MR-STAPH DNA AMP PROBE: CPT

## 2023-01-12 LAB
BACTERIA SPEC CULT: NORMAL
COLONY COUNT,CNT: NORMAL
COLONY COUNT,CNT: NORMAL
SPECIAL REQUESTS,SREQ: NORMAL

## 2023-01-23 ENCOUNTER — ANESTHESIA EVENT (OUTPATIENT)
Dept: SURGERY | Age: 73
End: 2023-01-23
Payer: MEDICARE

## 2023-01-24 ENCOUNTER — ANESTHESIA (OUTPATIENT)
Dept: SURGERY | Age: 73
End: 2023-01-24
Payer: MEDICARE

## 2023-01-24 ENCOUNTER — HOSPITAL ENCOUNTER (OUTPATIENT)
Age: 73
Discharge: HOME HEALTH | End: 2023-01-25
Attending: ORTHOPAEDIC SURGERY | Admitting: ORTHOPAEDIC SURGERY
Payer: MEDICARE

## 2023-01-24 ENCOUNTER — APPOINTMENT (OUTPATIENT)
Dept: GENERAL RADIOLOGY | Age: 73
End: 2023-01-24
Attending: ORTHOPAEDIC SURGERY
Payer: MEDICARE

## 2023-01-24 DIAGNOSIS — Z96.651 STATUS POST RIGHT KNEE REPLACEMENT: Primary | ICD-10-CM

## 2023-01-24 LAB
GLUCOSE BLD STRIP.AUTO-MCNC: 116 MG/DL (ref 65–100)
PERFORMED BY, TECHID: ABNORMAL

## 2023-01-24 PROCEDURE — 77030035643 HC BLD SAW OSC PRECIS STRY -C: Performed by: ORTHOPAEDIC SURGERY

## 2023-01-24 PROCEDURE — 82962 GLUCOSE BLOOD TEST: CPT

## 2023-01-24 PROCEDURE — C1713 ANCHOR/SCREW BN/BN,TIS/BN: HCPCS | Performed by: ORTHOPAEDIC SURGERY

## 2023-01-24 PROCEDURE — C1776 JOINT DEVICE (IMPLANTABLE): HCPCS | Performed by: ORTHOPAEDIC SURGERY

## 2023-01-24 PROCEDURE — 97161 PT EVAL LOW COMPLEX 20 MIN: CPT

## 2023-01-24 PROCEDURE — 76210000006 HC OR PH I REC 0.5 TO 1 HR: Performed by: ORTHOPAEDIC SURGERY

## 2023-01-24 PROCEDURE — 77030013079 HC BLNKT BAIR HGGR 3M -A: Performed by: ANESTHESIOLOGY

## 2023-01-24 PROCEDURE — 74011000250 HC RX REV CODE- 250: Performed by: NURSE ANESTHETIST, CERTIFIED REGISTERED

## 2023-01-24 PROCEDURE — 77030010507 HC ADH SKN DERMBND J&J -B: Performed by: ORTHOPAEDIC SURGERY

## 2023-01-24 PROCEDURE — 77030010785: Performed by: ORTHOPAEDIC SURGERY

## 2023-01-24 PROCEDURE — 76010000173 HC OR TIME 3 TO 3.5 HR INTENSV-TIER 1: Performed by: ORTHOPAEDIC SURGERY

## 2023-01-24 PROCEDURE — 77030038692 HC WND DEB SYS IRMX -B: Performed by: ORTHOPAEDIC SURGERY

## 2023-01-24 PROCEDURE — 77030013708 HC HNDPC SUC IRR PULS STRY –B: Performed by: ORTHOPAEDIC SURGERY

## 2023-01-24 PROCEDURE — 64447 NJX AA&/STRD FEMORAL NRV IMG: CPT

## 2023-01-24 PROCEDURE — 77030002933 HC SUT MCRYL J&J -A: Performed by: ORTHOPAEDIC SURGERY

## 2023-01-24 PROCEDURE — 74011250636 HC RX REV CODE- 250/636: Performed by: ANESTHESIOLOGY

## 2023-01-24 PROCEDURE — 97116 GAIT TRAINING THERAPY: CPT

## 2023-01-24 PROCEDURE — 77030006835 HC BLD SAW SAG STRY -B: Performed by: ORTHOPAEDIC SURGERY

## 2023-01-24 PROCEDURE — 77030013495: Performed by: ORTHOPAEDIC SURGERY

## 2023-01-24 PROCEDURE — 74011250636 HC RX REV CODE- 250/636: Performed by: NURSE ANESTHETIST, CERTIFIED REGISTERED

## 2023-01-24 PROCEDURE — 62270 DX LMBR SPI PNXR: CPT | Performed by: ANESTHESIOLOGY

## 2023-01-24 PROCEDURE — 74011000258 HC RX REV CODE- 258: Performed by: NURSE ANESTHETIST, CERTIFIED REGISTERED

## 2023-01-24 PROCEDURE — 77030033067 HC SUT PDO STRATFX SPIR J&J -B: Performed by: ORTHOPAEDIC SURGERY

## 2023-01-24 PROCEDURE — 74011250637 HC RX REV CODE- 250/637: Performed by: ORTHOPAEDIC SURGERY

## 2023-01-24 PROCEDURE — 64450 NJX AA&/STRD OTHER PN/BRANCH: CPT | Performed by: ANESTHESIOLOGY

## 2023-01-24 PROCEDURE — 77030013189 HC SLV COMPR SCD HUNT -B: Performed by: ORTHOPAEDIC SURGERY

## 2023-01-24 PROCEDURE — 77030003665 HC NDL SPN BBMI -A: Performed by: ORTHOPAEDIC SURGERY

## 2023-01-24 PROCEDURE — 77030031139 HC SUT VCRL2 J&J -A: Performed by: ORTHOPAEDIC SURGERY

## 2023-01-24 PROCEDURE — 77030018673: Performed by: ORTHOPAEDIC SURGERY

## 2023-01-24 PROCEDURE — 74011000250 HC RX REV CODE- 250: Performed by: ORTHOPAEDIC SURGERY

## 2023-01-24 PROCEDURE — 74011000250 HC RX REV CODE- 250: Performed by: ANESTHESIOLOGY

## 2023-01-24 PROCEDURE — 74011000258 HC RX REV CODE- 258: Performed by: ORTHOPAEDIC SURGERY

## 2023-01-24 PROCEDURE — 76060000037 HC ANESTHESIA 3 TO 3.5 HR: Performed by: ORTHOPAEDIC SURGERY

## 2023-01-24 PROCEDURE — 73560 X-RAY EXAM OF KNEE 1 OR 2: CPT

## 2023-01-24 PROCEDURE — 77030006789 HC BLD SAW OSC STRY -C: Performed by: ORTHOPAEDIC SURGERY

## 2023-01-24 PROCEDURE — 77030041279 HC DRSG PRMSL AG MDII -B: Performed by: ORTHOPAEDIC SURGERY

## 2023-01-24 PROCEDURE — 74011250636 HC RX REV CODE- 250/636: Performed by: ORTHOPAEDIC SURGERY

## 2023-01-24 PROCEDURE — 74011250637 HC RX REV CODE- 250/637: Performed by: PHYSICIAN ASSISTANT

## 2023-01-24 PROCEDURE — 2709999900 HC NON-CHARGEABLE SUPPLY: Performed by: ORTHOPAEDIC SURGERY

## 2023-01-24 DEVICE — BEARING TIB FIX 7 KNEE BASE POROUS ATTUNE AFFIXIUM: Type: IMPLANTABLE DEVICE | Site: KNEE | Status: FUNCTIONAL

## 2023-01-24 DEVICE — COMPONENT PAT DIA41MM POLYETH DOME CEM MEDIALIZED ATTUNE: Type: IMPLANTABLE DEVICE | Site: KNEE | Status: FUNCTIONAL

## 2023-01-24 DEVICE — KNEE K2 TOT HEMI ADV CMTLS IMPL CAPPED SYNTHES: Type: IMPLANTABLE DEVICE | Site: KNEE | Status: FUNCTIONAL

## 2023-01-24 DEVICE — INSERT TIB SZ 8 THK6MM KNEE POST STBL FIX BEAR ATTUNE: Type: IMPLANTABLE DEVICE | Site: KNEE | Status: FUNCTIONAL

## 2023-01-24 DEVICE — COMPONENT FEM PS 8 RT KNEE CMNTLS POROUS ATTUNE: Type: IMPLANTABLE DEVICE | Site: KNEE | Status: FUNCTIONAL

## 2023-01-24 DEVICE — CEMENT BNE 20ML 40GM FULL DOSE PMMA W/O ANTIBIO M VISC: Type: IMPLANTABLE DEVICE | Site: KNEE | Status: FUNCTIONAL

## 2023-01-24 RX ORDER — FENTANYL CITRATE 50 UG/ML
50 INJECTION, SOLUTION INTRAMUSCULAR; INTRAVENOUS
Status: DISCONTINUED | OUTPATIENT
Start: 2023-01-24 | End: 2023-01-24 | Stop reason: HOSPADM

## 2023-01-24 RX ORDER — NORETHINDRONE AND ETHINYL ESTRADIOL 0.5-0.035
5 KIT ORAL AS NEEDED
Status: DISCONTINUED | OUTPATIENT
Start: 2023-01-24 | End: 2023-01-24 | Stop reason: HOSPADM

## 2023-01-24 RX ORDER — SODIUM CHLORIDE, SODIUM LACTATE, POTASSIUM CHLORIDE, CALCIUM CHLORIDE 600; 310; 30; 20 MG/100ML; MG/100ML; MG/100ML; MG/100ML
20 INJECTION, SOLUTION INTRAVENOUS CONTINUOUS
Status: DISCONTINUED | OUTPATIENT
Start: 2023-01-24 | End: 2023-01-24

## 2023-01-24 RX ORDER — SODIUM CHLORIDE 0.9 % (FLUSH) 0.9 %
5-40 SYRINGE (ML) INJECTION AS NEEDED
Status: DISCONTINUED | OUTPATIENT
Start: 2023-01-24 | End: 2023-01-24 | Stop reason: HOSPADM

## 2023-01-24 RX ORDER — OXYCODONE HYDROCHLORIDE 5 MG/1
5 TABLET ORAL
Status: DISCONTINUED | OUTPATIENT
Start: 2023-01-24 | End: 2023-01-24

## 2023-01-24 RX ORDER — OXYCODONE HYDROCHLORIDE 5 MG/1
10 TABLET ORAL ONCE
Status: COMPLETED | OUTPATIENT
Start: 2023-01-24 | End: 2023-01-24

## 2023-01-24 RX ORDER — CELECOXIB 200 MG/1
200 CAPSULE ORAL 2 TIMES DAILY
Status: DISCONTINUED | OUTPATIENT
Start: 2023-01-24 | End: 2023-01-25 | Stop reason: HOSPADM

## 2023-01-24 RX ORDER — DIPHENHYDRAMINE HCL 12.5MG/5ML
12.5 ELIXIR ORAL
Status: ACTIVE | OUTPATIENT
Start: 2023-01-24 | End: 2023-01-25

## 2023-01-24 RX ORDER — SODIUM CHLORIDE 0.9 % (FLUSH) 0.9 %
5-40 SYRINGE (ML) INJECTION AS NEEDED
Status: DISCONTINUED | OUTPATIENT
Start: 2023-01-24 | End: 2023-01-25 | Stop reason: HOSPADM

## 2023-01-24 RX ORDER — ATORVASTATIN CALCIUM 20 MG/1
20 TABLET, FILM COATED ORAL DAILY
Status: DISCONTINUED | OUTPATIENT
Start: 2023-01-24 | End: 2023-01-25 | Stop reason: HOSPADM

## 2023-01-24 RX ORDER — LANOLIN ALCOHOL/MO/W.PET/CERES
2000 CREAM (GRAM) TOPICAL DAILY
Status: DISCONTINUED | OUTPATIENT
Start: 2023-01-24 | End: 2023-01-25 | Stop reason: HOSPADM

## 2023-01-24 RX ORDER — HYDROMORPHONE HYDROCHLORIDE 1 MG/ML
0.5 INJECTION, SOLUTION INTRAMUSCULAR; INTRAVENOUS; SUBCUTANEOUS
Status: DISCONTINUED | OUTPATIENT
Start: 2023-01-24 | End: 2023-01-24 | Stop reason: HOSPADM

## 2023-01-24 RX ORDER — MIDAZOLAM HYDROCHLORIDE 1 MG/ML
1 INJECTION, SOLUTION INTRAMUSCULAR; INTRAVENOUS AS NEEDED
Status: DISCONTINUED | OUTPATIENT
Start: 2023-01-24 | End: 2023-01-24 | Stop reason: HOSPADM

## 2023-01-24 RX ORDER — ONDANSETRON 2 MG/ML
4 INJECTION INTRAMUSCULAR; INTRAVENOUS AS NEEDED
Status: DISCONTINUED | OUTPATIENT
Start: 2023-01-24 | End: 2023-01-24 | Stop reason: HOSPADM

## 2023-01-24 RX ORDER — ACETAMINOPHEN 325 MG/1
650 TABLET ORAL ONCE
Status: COMPLETED | OUTPATIENT
Start: 2023-01-24 | End: 2023-01-24

## 2023-01-24 RX ORDER — LIDOCAINE HYDROCHLORIDE 10 MG/ML
0.1 INJECTION, SOLUTION EPIDURAL; INFILTRATION; INTRACAUDAL; PERINEURAL AS NEEDED
Status: DISCONTINUED | OUTPATIENT
Start: 2023-01-24 | End: 2023-01-24 | Stop reason: HOSPADM

## 2023-01-24 RX ORDER — SODIUM CHLORIDE 9 MG/ML
125 INJECTION, SOLUTION INTRAVENOUS CONTINUOUS
Status: DISPENSED | OUTPATIENT
Start: 2023-01-24 | End: 2023-01-25

## 2023-01-24 RX ORDER — NALOXONE HYDROCHLORIDE 0.4 MG/ML
0.4 INJECTION, SOLUTION INTRAMUSCULAR; INTRAVENOUS; SUBCUTANEOUS AS NEEDED
Status: DISCONTINUED | OUTPATIENT
Start: 2023-01-24 | End: 2023-01-25 | Stop reason: HOSPADM

## 2023-01-24 RX ORDER — ROPIVACAINE HYDROCHLORIDE 5 MG/ML
150 INJECTION, SOLUTION EPIDURAL; INFILTRATION; PERINEURAL
Status: DISCONTINUED | OUTPATIENT
Start: 2023-01-24 | End: 2023-01-24

## 2023-01-24 RX ORDER — MORPHINE SULFATE 2 MG/ML
2 INJECTION, SOLUTION INTRAMUSCULAR; INTRAVENOUS
Status: DISCONTINUED | OUTPATIENT
Start: 2023-01-24 | End: 2023-01-24 | Stop reason: HOSPADM

## 2023-01-24 RX ORDER — FACIAL-BODY WIPES
10 EACH TOPICAL DAILY PRN
Status: DISCONTINUED | OUTPATIENT
Start: 2023-01-26 | End: 2023-01-25 | Stop reason: HOSPADM

## 2023-01-24 RX ORDER — OXYCODONE AND ACETAMINOPHEN 5; 325 MG/1; MG/1
1 TABLET ORAL AS NEEDED
Status: DISCONTINUED | OUTPATIENT
Start: 2023-01-24 | End: 2023-01-24 | Stop reason: HOSPADM

## 2023-01-24 RX ORDER — PROPOFOL 10 MG/ML
INJECTION, EMULSION INTRAVENOUS
Status: DISCONTINUED | OUTPATIENT
Start: 2023-01-24 | End: 2023-01-24 | Stop reason: HOSPADM

## 2023-01-24 RX ORDER — ROPIVACAINE HYDROCHLORIDE 5 MG/ML
INJECTION, SOLUTION EPIDURAL; INFILTRATION; PERINEURAL
Status: COMPLETED | OUTPATIENT
Start: 2023-01-24 | End: 2023-01-24

## 2023-01-24 RX ORDER — OXYCODONE HYDROCHLORIDE 5 MG/1
5 TABLET ORAL
Status: DISCONTINUED | OUTPATIENT
Start: 2023-01-24 | End: 2023-01-25 | Stop reason: HOSPADM

## 2023-01-24 RX ORDER — MIDAZOLAM HYDROCHLORIDE 1 MG/ML
0.5 INJECTION, SOLUTION INTRAMUSCULAR; INTRAVENOUS
Status: DISCONTINUED | OUTPATIENT
Start: 2023-01-24 | End: 2023-01-24 | Stop reason: HOSPADM

## 2023-01-24 RX ORDER — MIDAZOLAM HYDROCHLORIDE 1 MG/ML
INJECTION, SOLUTION INTRAMUSCULAR; INTRAVENOUS AS NEEDED
Status: DISCONTINUED | OUTPATIENT
Start: 2023-01-24 | End: 2023-01-24 | Stop reason: HOSPADM

## 2023-01-24 RX ORDER — DIPHENHYDRAMINE HYDROCHLORIDE 50 MG/ML
12.5 INJECTION, SOLUTION INTRAMUSCULAR; INTRAVENOUS AS NEEDED
Status: DISCONTINUED | OUTPATIENT
Start: 2023-01-24 | End: 2023-01-24 | Stop reason: HOSPADM

## 2023-01-24 RX ORDER — SODIUM CHLORIDE 0.9 % (FLUSH) 0.9 %
5-40 SYRINGE (ML) INJECTION EVERY 8 HOURS
Status: DISCONTINUED | OUTPATIENT
Start: 2023-01-24 | End: 2023-01-24 | Stop reason: HOSPADM

## 2023-01-24 RX ORDER — ASPIRIN 81 MG/1
81 TABLET ORAL 2 TIMES DAILY
Status: DISCONTINUED | OUTPATIENT
Start: 2023-01-24 | End: 2023-01-25 | Stop reason: HOSPADM

## 2023-01-24 RX ORDER — CELECOXIB 200 MG/1
200 CAPSULE ORAL 2 TIMES DAILY
Status: DISCONTINUED | OUTPATIENT
Start: 2023-01-24 | End: 2023-01-24

## 2023-01-24 RX ORDER — CELECOXIB 200 MG/1
200 CAPSULE ORAL ONCE
Status: COMPLETED | OUTPATIENT
Start: 2023-01-24 | End: 2023-01-24

## 2023-01-24 RX ORDER — CEFAZOLIN SODIUM 1 G/3ML
INJECTION, POWDER, FOR SOLUTION INTRAMUSCULAR; INTRAVENOUS AS NEEDED
Status: DISCONTINUED | OUTPATIENT
Start: 2023-01-24 | End: 2023-01-24 | Stop reason: HOSPADM

## 2023-01-24 RX ORDER — ACETAMINOPHEN 325 MG/1
650 TABLET ORAL EVERY 6 HOURS
Status: DISCONTINUED | OUTPATIENT
Start: 2023-01-24 | End: 2023-01-24

## 2023-01-24 RX ORDER — SODIUM CHLORIDE 0.9 % (FLUSH) 0.9 %
5-40 SYRINGE (ML) INJECTION EVERY 8 HOURS
Status: DISCONTINUED | OUTPATIENT
Start: 2023-01-24 | End: 2023-01-24

## 2023-01-24 RX ORDER — BUPIVACAINE HYDROCHLORIDE 7.5 MG/ML
INJECTION, SOLUTION EPIDURAL; RETROBULBAR
Status: COMPLETED | OUTPATIENT
Start: 2023-01-24 | End: 2023-01-24

## 2023-01-24 RX ORDER — AMOXICILLIN 250 MG
1 CAPSULE ORAL 2 TIMES DAILY
Status: DISCONTINUED | OUTPATIENT
Start: 2023-01-24 | End: 2023-01-25 | Stop reason: HOSPADM

## 2023-01-24 RX ORDER — ONDANSETRON 2 MG/ML
4 INJECTION INTRAMUSCULAR; INTRAVENOUS
Status: ACTIVE | OUTPATIENT
Start: 2023-01-24 | End: 2023-01-25

## 2023-01-24 RX ORDER — ROPIVACAINE HYDROCHLORIDE 5 MG/ML
INJECTION, SOLUTION EPIDURAL; INFILTRATION; PERINEURAL
Status: COMPLETED
Start: 2023-01-24 | End: 2023-01-24

## 2023-01-24 RX ORDER — ACETAMINOPHEN 500 MG
1000 TABLET ORAL EVERY 6 HOURS
Status: DISCONTINUED | OUTPATIENT
Start: 2023-01-24 | End: 2023-01-25 | Stop reason: HOSPADM

## 2023-01-24 RX ORDER — SODIUM CHLORIDE, SODIUM LACTATE, POTASSIUM CHLORIDE, CALCIUM CHLORIDE 600; 310; 30; 20 MG/100ML; MG/100ML; MG/100ML; MG/100ML
INJECTION, SOLUTION INTRAVENOUS
Status: DISCONTINUED | OUTPATIENT
Start: 2023-01-24 | End: 2023-01-24 | Stop reason: HOSPADM

## 2023-01-24 RX ORDER — ONDANSETRON 2 MG/ML
INJECTION INTRAMUSCULAR; INTRAVENOUS AS NEEDED
Status: DISCONTINUED | OUTPATIENT
Start: 2023-01-24 | End: 2023-01-24 | Stop reason: HOSPADM

## 2023-01-24 RX ORDER — MORPHINE SULFATE 2 MG/ML
1 INJECTION, SOLUTION INTRAMUSCULAR; INTRAVENOUS
Status: ACTIVE | OUTPATIENT
Start: 2023-01-24 | End: 2023-01-25

## 2023-01-24 RX ORDER — FENTANYL CITRATE 50 UG/ML
50 INJECTION, SOLUTION INTRAMUSCULAR; INTRAVENOUS AS NEEDED
Status: DISCONTINUED | OUTPATIENT
Start: 2023-01-24 | End: 2023-01-24 | Stop reason: HOSPADM

## 2023-01-24 RX ORDER — FENTANYL CITRATE 50 UG/ML
INJECTION, SOLUTION INTRAMUSCULAR; INTRAVENOUS AS NEEDED
Status: DISCONTINUED | OUTPATIENT
Start: 2023-01-24 | End: 2023-01-24 | Stop reason: HOSPADM

## 2023-01-24 RX ORDER — NORETHINDRONE AND ETHINYL ESTRADIOL 0.5-0.035
KIT ORAL AS NEEDED
Status: DISCONTINUED | OUTPATIENT
Start: 2023-01-24 | End: 2023-01-24 | Stop reason: HOSPADM

## 2023-01-24 RX ORDER — POLYETHYLENE GLYCOL 3350 17 G/17G
17 POWDER, FOR SOLUTION ORAL DAILY
Status: DISCONTINUED | OUTPATIENT
Start: 2023-01-24 | End: 2023-01-25 | Stop reason: HOSPADM

## 2023-01-24 RX ADMIN — SENNOSIDES AND DOCUSATE SODIUM 1 TABLET: 50; 8.6 TABLET ORAL at 20:39

## 2023-01-24 RX ADMIN — MIDAZOLAM HYDROCHLORIDE 0.5 MG: 2 INJECTION, SOLUTION INTRAMUSCULAR; INTRAVENOUS at 09:26

## 2023-01-24 RX ADMIN — CYANOCOBALAMIN TAB 1000 MCG 2000 MCG: 1000 TAB at 13:06

## 2023-01-24 RX ADMIN — FENTANYL CITRATE 50 MCG: 50 INJECTION, SOLUTION INTRAMUSCULAR; INTRAVENOUS at 07:50

## 2023-01-24 RX ADMIN — OXYCODONE HYDROCHLORIDE 5 MG: 5 TABLET ORAL at 14:06

## 2023-01-24 RX ADMIN — ACETAMINOPHEN 1000 MG: 500 TABLET ORAL at 18:01

## 2023-01-24 RX ADMIN — SODIUM CHLORIDE, POTASSIUM CHLORIDE, SODIUM LACTATE AND CALCIUM CHLORIDE 20 ML/HR: 600; 310; 30; 20 INJECTION, SOLUTION INTRAVENOUS at 07:08

## 2023-01-24 RX ADMIN — BUPIVACAINE HYDROCHLORIDE 15 MG: 7.5 INJECTION, SOLUTION EPIDURAL; RETROBULBAR at 08:05

## 2023-01-24 RX ADMIN — SENNOSIDES AND DOCUSATE SODIUM 1 TABLET: 50; 8.6 TABLET ORAL at 13:06

## 2023-01-24 RX ADMIN — ASPIRIN 81 MG: 81 TABLET, COATED ORAL at 20:39

## 2023-01-24 RX ADMIN — SODIUM CHLORIDE, POTASSIUM CHLORIDE, SODIUM LACTATE AND CALCIUM CHLORIDE: 600; 310; 30; 20 INJECTION, SOLUTION INTRAVENOUS at 09:43

## 2023-01-24 RX ADMIN — SODIUM CHLORIDE, POTASSIUM CHLORIDE, SODIUM LACTATE AND CALCIUM CHLORIDE: 600; 310; 30; 20 INJECTION, SOLUTION INTRAVENOUS at 07:30

## 2023-01-24 RX ADMIN — MIDAZOLAM HYDROCHLORIDE 1 MG: 2 INJECTION, SOLUTION INTRAMUSCULAR; INTRAVENOUS at 08:13

## 2023-01-24 RX ADMIN — ATORVASTATIN CALCIUM 20 MG: 20 TABLET, FILM COATED ORAL at 13:06

## 2023-01-24 RX ADMIN — SODIUM CHLORIDE 125 ML/HR: 9 INJECTION, SOLUTION INTRAVENOUS at 13:07

## 2023-01-24 RX ADMIN — FENTANYL CITRATE 50 MCG: 50 INJECTION, SOLUTION INTRAMUSCULAR; INTRAVENOUS at 07:32

## 2023-01-24 RX ADMIN — POLYETHYLENE GLYCOL 3350 17 G: 17 POWDER, FOR SOLUTION ORAL at 13:06

## 2023-01-24 RX ADMIN — TRANEXAMIC ACID 1 G: 1 INJECTION, SOLUTION INTRAVENOUS at 08:24

## 2023-01-24 RX ADMIN — ACETAMINOPHEN 650 MG: 325 TABLET, FILM COATED ORAL at 07:04

## 2023-01-24 RX ADMIN — EPHEDRINE SULFATE 10 MG: 50 INJECTION INTRAVENOUS at 08:18

## 2023-01-24 RX ADMIN — TRANEXAMIC ACID 1 G: 1 INJECTION, SOLUTION INTRAVENOUS at 09:57

## 2023-01-24 RX ADMIN — KETOROLAC TROMETHAMINE: 30 INJECTION, SOLUTION INTRAMUSCULAR; INTRAVENOUS at 07:00

## 2023-01-24 RX ADMIN — OXYCODONE HYDROCHLORIDE 10 MG: 5 TABLET ORAL at 07:04

## 2023-01-24 RX ADMIN — MIDAZOLAM HYDROCHLORIDE 0.5 MG: 2 INJECTION, SOLUTION INTRAMUSCULAR; INTRAVENOUS at 08:43

## 2023-01-24 RX ADMIN — ACETAMINOPHEN 1000 MG: 500 TABLET ORAL at 13:06

## 2023-01-24 RX ADMIN — CELECOXIB 200 MG: 200 CAPSULE ORAL at 07:04

## 2023-01-24 RX ADMIN — ONDANSETRON 4 MG: 2 INJECTION INTRAMUSCULAR; INTRAVENOUS at 09:59

## 2023-01-24 RX ADMIN — CEFAZOLIN SODIUM 3 G: 1 INJECTION, POWDER, FOR SOLUTION INTRAMUSCULAR; INTRAVENOUS at 08:18

## 2023-01-24 RX ADMIN — ROPIVACAINE HYDROCHLORIDE 20 ML: 5 INJECTION, SOLUTION EPIDURAL; INFILTRATION; PERINEURAL at 07:34

## 2023-01-24 RX ADMIN — CEFAZOLIN 2 G: 1 INJECTION, POWDER, FOR SOLUTION INTRAMUSCULAR; INTRAVENOUS at 15:41

## 2023-01-24 RX ADMIN — CELECOXIB 200 MG: 200 CAPSULE ORAL at 18:01

## 2023-01-24 RX ADMIN — ASPIRIN 81 MG: 81 TABLET, COATED ORAL at 13:06

## 2023-01-24 RX ADMIN — PROPOFOL 50 MCG/KG/MIN: 10 INJECTION, EMULSION INTRAVENOUS at 08:10

## 2023-01-24 NOTE — PERIOP NOTES
Patient alert and oriented x4, VS stable, no complaints of pain at this time. Sister and brother in waiting room. Bed in low position, call bell within reach.

## 2023-01-24 NOTE — ANESTHESIA PREPROCEDURE EVALUATION
Relevant Problems   No relevant active problems     2021 left TKA (SAB +ACB)    Anesthetic History   No history of anesthetic complications            Review of Systems / Medical History  Patient summary reviewed and pertinent labs reviewed    Pulmonary        Sleep apnea           Neuro/Psych   Within defined limits           Cardiovascular              Hyperlipidemia    Exercise tolerance: >4 METS     GI/Hepatic/Renal  Within defined limits              Endo/Other      Hypothyroidism  Obesity and arthritis     Other Findings            Past Medical History:   Diagnosis Date    Arthritis     History of colonic polyps     Hyperlipidemia     Sleep apnea     uses CPAP    Thyroid disease     hypothyroidism       Past Surgical History:   Procedure Laterality Date    HX COLONOSCOPY      HX HYSTERECTOMY      HX HYSTEROSCOPY      HX KNEE REPLACEMENT Left 2020    HX ORTHOPAEDIC      lumbar    DE UNLISTED PROCEDURE ABDOMEN PERITONEUM & OMENTUM      hiatal hernia       Current Outpatient Medications   Medication Instructions    atorvastatin (LIPITOR) 20 mg, Oral, DAILY    cyanocobalamin 2,000 mcg, Oral, DAILY    levothyroxine (SYNTHROID) 125 mcg, Oral, DAILY BEFORE BREAKFAST       No current facility-administered medications for this visit. No current outpatient medications on file.      Facility-Administered Medications Ordered in Other Visits   Medication Dose Route Frequency    lactated Ringers infusion  20 mL/hr IntraVENous CONTINUOUS    ceFAZolin (ANCEF) 3 g in sterile water (preservative free) 30 mL IV syringe  3 g IntraVENous ONCE    tranexamic acid (CYKLOKAPRON) 1,000 mg in 0.9% sodium chloride (MBP/ADV) 50 mL IVPB  1 g IntraVENous ONCE    ropivacaine (PF) (NAROPIN) 5 mg/mL (0.5 %) injection 150 mg  150 mg Other NOW    tranexamic acid (CYKLOKAPRON) 1,000 mg in 0.9% sodium chloride (MBP/ADV) 50 mL IVPB  1 g IntraVENous ONCE    acetaminophen (TYLENOL) tablet 650 mg  650 mg Oral ONCE    celecoxib (CELEBREX) capsule 200 mg  200 mg Oral ONCE    oxyCODONE IR (ROXICODONE) tablet 10 mg  10 mg Oral ONCE    cloNIDine (PF) 80 mcg, EPINEPHrine HCl (PF) 0.5 mg, ropivacaine (PF) 5 mg/mL (0.5 %) 30 mL, ketorolac 30 mg in 0.9% sodium chloride 17.7 mL solution   Other ONCE       No data found. Lab Results   Component Value Date/Time    WBC 7.8 01/10/2023 02:10 PM    HGB 11.8 01/10/2023 02:10 PM    HCT 35.1 01/10/2023 02:10 PM    PLATELET 659 12/72/5911 02:10 PM    MCV 77.3 (L) 01/10/2023 02:10 PM     Lab Results   Component Value Date/Time    Sodium 141 01/10/2023 02:10 PM    Potassium 4.2 01/10/2023 02:10 PM    Chloride 106 01/10/2023 02:10 PM    CO2 28 01/10/2023 02:10 PM    Anion gap 7 01/10/2023 02:10 PM    Glucose 97 01/10/2023 02:10 PM    BUN 22 (H) 01/10/2023 02:10 PM    Creatinine 0.70 01/10/2023 02:10 PM    BUN/Creatinine ratio 31 (H) 01/10/2023 02:10 PM    GFR est AA >60 10/12/2021 06:04 AM    GFR est non-AA 59 (L) 10/12/2021 06:04 AM    Calcium 9.2 01/10/2023 02:10 PM     No results found for: APTT, PTP, INR, INREXT, INREXT  Lab Results   Component Value Date/Time    Glucose 97 01/10/2023 02:10 PM     Physical Exam    Airway  Mallampati: II  TM Distance: 4 - 6 cm  Neck ROM: normal range of motion   Mouth opening: Normal     Cardiovascular    Rhythm: regular  Rate: normal         Dental    Dentition: Edentulous     Pulmonary  Breath sounds clear to auscultation               Abdominal  GI exam deferred       Other Findings            Anesthetic Plan    ASA: 2  Anesthesia type: spinal    Monitoring Plan: Continuous noninvasive hemodynamic monitoring  Post-op pain plan if not by surgeon: regional and peripheral nerve block single    Induction: Intravenous  Anesthetic plan and risks discussed with: Patient and Family      Benefits and risks of spinal anesthesia discussed with patient/family. All questions answered.

## 2023-01-24 NOTE — PROGRESS NOTES
TRANSFER - OUT REPORT:    Verbal report given to UBALDO Gould (name) on Letty Win  being transferred to  (unit) for routine post - op       Report consisted of patients Situation, Background, Assessment and   Recommendations(SBAR). Information from the following report(s) Procedure Summary and Quality Measures was reviewed with the receiving nurse. Opportunity for questions and clarification was provided.       Patient transported with:   Registered Nurse

## 2023-01-24 NOTE — PROGRESS NOTES
TRANSFER - IN REPORT:    Verbal report received from [facility] UBALDO NOEL (name) on Paulina Primrose  being received from PACU (unit) for routine post - op      Report consisted of patients Situation, Background, Assessment and   Recommendations(SBAR). Information from the following report(s) SBAR, OR Summary, Procedure Summary, Intake/Output, and MAR was reviewed with the receiving nurse. Opportunity for questions and clarification was provided. Assessment completed upon patients arrival to unit and care assumed.        Patient has steristrip, aquacell and ace wrap to RLE clean dry and intact

## 2023-01-24 NOTE — OP NOTES
Name: Natacha Ellison    MRN: [id number]    Date: 01/24/23    Surgeon: Neisha Richards MD    Preoperative diagnosis: Severe osteoarthritis of right knee    Postoperative diagnosis: Severe osteoarthritis of right knee    Operative procedure: Right Total knee arthroplasty    Assistants:  OR scrub tech  2.  Surgical FA    Anesthesia: Spinal + adductor canal block    Complications: None    Estimated blood loss: 50 cc     Drain: None    Specimen: None    Implants:   - Depuy Attune press fit  Femoral component - Size 8 posterior stabilized  Tibial component - size 7  Polyethylene insert - size 8X6  Patellar component -size 41    Indication: 74 y.o. y/o female Patient had chronic knee pain. She was treated with oral pain medication, anti-inflammatory medication, programmed knee exercises and intra-articular cortisone injections. Once all conservative treatment was exhausted, we discussed about knee replacement surgery. Risk and benefits were discussed. Possible complications including but not limited to infection, blood clot, stiffness of the knee, fracture, nerve or vessel injury, tendon injury and implants loosening which might require subsequent surgery were discussed. More serious complications including blood clot in the lungs or cardiac arrest which may lead to death were also discussed. Procedure detail:   Patient as well as right lower extremity was identified and marked in the holding area and then patient was brought to the operating room. spinal Anesthesia was induced by anesthesia provider  and then standard prepping and draping was done. Timeout was completed. 3Gram of Ancef was done within 30 minutes of procedure. 1 g of tranexamic acid was infused before incision. Tourniquet was inflated to 225 mmHg. Now standard midline incision was made. Dissection was carried up to the extensor mechanism. Medial parapatellar arthrotomy was done. Medial banana peel was done to have proper exposure. Meniscotibial ligament was released medially. Now knee was flexed and intramedullary drilling was done for the femur. Distal femoral cutting guide was inserted and knee was cut for 9 mm distal cut at 5 degrees of valgus. Now attention was drawn to proximal tibia. Extra medullary guide was used and proximal tibia resection was done for 2 mm from worn out tibial condyle. Now extension gap was checked and then necessary lateral release was performed to achieve rectangular gap. Now [employer] as well as transepicondylar lines were drawn and then femoral sizing guide was applied. Once femur size mentioned above was decided, 4-in-1 cutting block was applied and then remaining for cuts were made on distal femur. Box cut was performed. Now knee was flexed at 90 and lamina  was used. Remaining menisci as well as posterior osteophytes were removed. Posterior capsule was injected with pain cocktail. Now trial femur was applied and then different size trial inserts were used. With above-mentioned size liner, knee was stable in varus valgus stress throughout the knee range of motion. Patella was tracking excellent. Knee flexion was from 0-120. Now all trial components were removed. Plenty of irrigation was done and cut bony surfaces were prepped for cementing. Cement was mixed on the back table and then above-mentioned sized real implants were inserted. Knee was placed in extension and cement was allowed to be dried. Meanwhile remaining pain cocktail injection was injected in periarticular tissues. Repeat irrigation was performed. Once cement was dry, tourniquet was deflated and bleeders were coagulated. Another gram of tranexamic acid was infused. .  Now closure was done with #1 Vicryl and #2 strata fix for arthrotomy closure. Subcutaneous tissues were closed with 2-0 Vicryl and 3-0 Monocryl. Aquasol dressing was applied. Ace bandage was applied.     Patient was brought to recovery room in stable condition. Recovery room neurovascular status was intact. I was scrubbed in entire case and performed all the steps by myself.     Ayanna Coates MD

## 2023-01-24 NOTE — PERIOP NOTES
Family notified via epic text messaging      Number in the computer is for pt's daughter, who is not here. Pt stated her sister and brother are in the waiting room, but pt cannot recall either of their cell phone numbers. Pt stated to call her cell phone, and the sister would answer it. Attempted, and no answer, no voicemail was left. Called Agnes in [facility] who agreed to let the family know in the waiting room that pt is about to be taken to room 432.

## 2023-01-24 NOTE — ANESTHESIA PROCEDURE NOTES
Spinal Block    Start time: 1/24/2023 7:50 AM  End time: 1/24/2023 8:05 AM  Performed by: ARNEL Carter  Authorized by: Paulina Le MD     Pre-procedure:   Indications: primary anesthetic  Preanesthetic Checklist: patient identified, risks and benefits discussed, anesthesia consent, site marked, patient being monitored, timeout performed and fire risk safety assessment completed and verbalized    Timeout Time: 07:49 EST      Spinal Block:   Patient Position:  Seated  Prep Region:  Lumbar  Prep: chlorhexidine and patient draped      Location:  L3-4  Technique:  Single shot  Local: bupivacaine (PF) (MARCAINE) 0.75 % (7.5 mg/mL) Intrathecal - Intrathecal, Back   15 mg - 1/24/2023 8:05:00 AM    Med Admin Time: 1/24/2023 8:05 AM    Needle:   Needle Type:  Quincke  Needle Gauge:  22 G  Attempts:  2      Events: CSF confirmed        Assessment:  Insertion:  Uncomplicated  Patient tolerance:  Patient tolerated the procedure well with no immediate complications

## 2023-01-24 NOTE — PROGRESS NOTES
Problem: Mobility Impaired (Adult and Pediatric)  Goal: *Acute Goals and Plan of Care (Insert Text)  Description: SBA with bed mob x 7 days  SBA with all transfers x7 days  Amb 150-200ft with RW and SBAx1 x7 days  Ascend/descend 2-4 steps with B rails and CGAx1 x7 days    Pt stated goals: to go home  Outcome: Not Met    PHYSICAL THERAPY EVALUATION  Patient: Amrita Bales (50 y.o. female)  Date: 1/24/2023  Primary Diagnosis: Osteoarthritis [M19.90]  Procedure(s) (LRB):  RIGHT TOTAL KNEE ARTHROPLASTY (GEN/SPINAL/REG) (Right) Day of Surgery   Precautions: WBAT       ASSESSMENT    Pt is a 74 y.o. female admitted to hospital s/p R TKR POD0 DOS 1/24/23 presents to PT with decreased bed mob, transfers, LE strength, gt, activity tolerance, and overall functional mobility. Pt semi supine in bed upon PT arrival, agreeable to evaluation. Pt A&O x 4. PLOF listed below. Based on the objective data described below, the patient presents with generalized weakness, impaired functional mobility, impaired amb, impaired balance, and decreased overall functional mobility. (See below for objective details and assist levels). Overall pt tolerated session good today with overall CGA with bed mob and transfers. Pt was able to amb approx 60ft with RW and CGAX1 WBAT on R LE without difficulty. Pt amb with slow emily, step to gt pattern. Pt req cues at times for correct walker placement and was able to adjust.  1 minor LOB when turning towards chair to sit down, corrected with min Ax1. Pt will benefit from continued skilled PT to address above deficits and return to PLOF. Current PT DC recommendation Home with Home Health Therapy once medically appropriate.        PLAN :  Recommendations and Planned Interventions: bed mobility training, transfer training, gait training, therapeutic exercises, patient and family training/education, and therapeutic activities      Recommend for staff: Out of bed to chair for meals, Amb to bathroom for toileting with gt belt and AD, and Amb in hallway    Frequency/Duration: Patient will be followed by physical therapy:  BID to address goals.     Recommendation for discharge: (in order for the patient to meet his/her long term goals)  Home with [address] [place] [address]    IF patient discharges home will need the following DME: pt has walker at home         SUBJECTIVE:   Patient semi supine in bed upon PT arrival, agreeable to work with PT    OBJECTIVE DATA SUMMARY:   HISTORY:    Past Medical History:   Diagnosis Date    Arthritis     History of colonic polyps     Hyperlipidemia     Sleep apnea     uses CPAP    Thyroid disease     hypothyroidism     Past Surgical History:   Procedure Laterality Date    HX COLONOSCOPY      HX HYSTERECTOMY      HX HYSTEROSCOPY      HX KNEE REPLACEMENT Left 2020    HX ORTHOPAEDIC      lumbar    MT UNLISTED PROCEDURE ABDOMEN PERITONEUM & OMENTUM      hiatal hernia       Home Situation  Home Environment: Private residence  # Steps to Enter: 2  Rails to Enter: Yes  Hand Rails : Bilateral  One/Two Story Residence: One story  Living Alone: Yes  Support Systems: Other Family Member(s) (pt's sister will be staying with her upon d/c)  Patient Expects to be Discharged to[de-identified] Home with home health    Personal factors and/or comorbidities impacting plan of care:     Home Situation  Home Environment: Private residence  # Steps to Enter: 2  Rails to Enter: Yes  Hand Rails : Bilateral  One/Two Story Residence: One story  Living Alone: Yes  Support Systems: Other Family Member(s) (pt's sister will be staying with her upon d/c)  Patient Expects to be Discharged to[de-identified] Home with home health    EXAMINATION/PRESENTATION/DECISION MAKING:   Critical Behavior:  Neurologic State: Alert  Orientation Level: Oriented X4  Cognition: Appropriate decision making       Skin:  intact where exposed, dressing noted on R LE    Edema: none noted    Range Of Motion:  AROM: Generally decreased, functional   R LE grossly decreased, L LE grossly WFL      Strength:    Strength: Generally decreased, functional  NT R LE  L LE grossly 4/5         Tone & Sensation:      Sensation: Intact to LT B LE       Functional Mobility:  Bed Mobility:     Supine to Sit: Contact guard assistance     Scooting: Stand-by assistance    Transfers:  Sit to Stand: Contact guard assistance  Stand to Sit: Contact guard assistance  Stand Pivot Transfers: Contact guard assistance                    Balance:   Sitting: Intact  Standing: Impaired; With support  Standing - Static: Fair;Constant support  Standing - Dynamic : Fair;Constant support    Ambulation/Gait Training:  Distance (ft): 60 Feet (ft)  Assistive Device: Walker, rolling;Gait belt  Ambulation - Level of Assistance: Contact guard assistance    Right Side Weight Bearing: As tolerated         Functional Measure:  [facility] AM-PAC 6 Clicks         Basic Mobility Inpatient Short Form  How much difficulty does the patient currently have. .. Unable A Lot A Little None   1. Turning over in bed (including adjusting bedclothes, sheets and blankets)? [] 1   [] 2   [x] 3   [] 4   2. Sitting down on and standing up from a chair with arms ( e.g., wheelchair, bedside commode, etc.)   [] 1   [] 2   [x] 3   [] 4   3. Moving from lying on back to sitting on the side of the bed? [] 1   [] 2   [x] 3   [] 4          How much help from another person does the patient currently need. .. Total A Lot A Little None   4. Moving to and from a bed to a chair (including a wheelchair)? [] 1   [] 2   [x] 3   [] 4   5. Need to walk in hospital room? [] 1   [] 2   [x] 3   [] 4   6. Climbing 3-5 steps with a railing? [] 1   [] 2   [x] 3   [] 4   © 2007, Trustees of [facility], under license to [employer].  All rights reserved     Score:  Initial: 18 Most Recent: X (Date: -- )   Interpretation of Tool:  Represents activities that are increasingly more difficult (i.e. Bed mobility, Transfers, Gait).  Score 24 23 22-20 19-15 14-10 9-7 6   Modifier CH CI CJ CK CL CM CN           Physical Therapy Evaluation Charge Determination   History Examination Presentation Decision-Making   MEDIUM  Complexity : 1-2 comorbidities / personal factors will impact the outcome/ POC  MEDIUM Complexity : 3 Standardized tests and measures addressing body structure, function, activity limitation and / or participation in recreation  LOW Complexity : Stable, uncomplicated  Other Functional Measure AMPA 6 MED      Based on the above components, the patient evaluation is determined to be of the following complexity level: LOW     Pain Ratin/10 during ambulation    Activity Tolerance:   Good    After treatment patient left in no apparent distress:   Bed locked and in lowest position Sitting in chair and Call bell within reach           COMMUNICATION/EDUCATION:   The patients plan of care was discussed with: Registered nurse. Fall prevention education was provided and the patient/caregiver indicated understanding. and Patient/family agree to work toward stated goals and plan of care.       Thank you for this referral.  Shaq Arechiga   Time Calculation: 31 mins

## 2023-01-24 NOTE — ANESTHESIA PROCEDURE NOTES
Peripheral Block    Start time: 1/24/2023 7:29 AM  End time: 1/24/2023 7:36 AM  Performed by: Magdy Banegas MD  Authorized by: Magdy Banegas MD       Pre-procedure: Indications: at surgeon's request and post-op pain management    Preanesthetic Checklist: patient identified, risks and benefits discussed, site marked, timeout performed, anesthesia consent given, patient being monitored and fire risk safety assessment completed and verbalized    Timeout Time: 07:29 EST      Block Type:   Block Type:   Adductor canal block  Laterality:  Right  Monitoring:  Standard ASA monitoring, continuous pulse ox, frequent vital sign checks, heart rate and responsive to questions  Injection Technique:  Single shot  Procedures: ultrasound guided    Patient Position: supine  Prep: chlorhexidine    Location:  Mid thigh  Needle Type:  Stimuplex  Needle Gauge:  21 G  Needle Localization:  Ultrasound guidance  Medication Injected:  Ropivacaine (PF) (NAROPIN)(0.5%) 5 mg/mL injection - Peripheral Nerve Block   20 mL - 1/24/2023 7:34:00 AM  Med Admin Time: 1/24/2023 7:34 AM    Assessment:  Number of attempts:  1  Injection Assessment:  Incremental injection every 5 mL, local visualized surrounding nerve on ultrasound, negative aspiration for CSF, negative aspiration for blood, no paresthesia, no intravascular symptoms and ultrasound image on chart  Patient tolerance:  Patient tolerated the procedure well with no immediate complications

## 2023-01-24 NOTE — PERIOP NOTES
Block completed by Dr. Tyrese Walker MD w/[facility] student and this RN.      Timeout: [id number]  Start: 8478  End: 0800

## 2023-01-25 VITALS
TEMPERATURE: 98.3 F | HEART RATE: 77 BPM | RESPIRATION RATE: 16 BRPM | WEIGHT: 266 LBS | HEIGHT: 72 IN | DIASTOLIC BLOOD PRESSURE: 55 MMHG | OXYGEN SATURATION: 97 % | BODY MASS INDEX: 36.03 KG/M2 | SYSTOLIC BLOOD PRESSURE: 91 MMHG

## 2023-01-25 PROCEDURE — 97110 THERAPEUTIC EXERCISES: CPT

## 2023-01-25 PROCEDURE — 74011250636 HC RX REV CODE- 250/636: Performed by: ORTHOPAEDIC SURGERY

## 2023-01-25 PROCEDURE — 97116 GAIT TRAINING THERAPY: CPT

## 2023-01-25 PROCEDURE — 97530 THERAPEUTIC ACTIVITIES: CPT

## 2023-01-25 PROCEDURE — 74011250637 HC RX REV CODE- 250/637: Performed by: PHYSICIAN ASSISTANT

## 2023-01-25 PROCEDURE — 74011000250 HC RX REV CODE- 250: Performed by: ORTHOPAEDIC SURGERY

## 2023-01-25 PROCEDURE — 97165 OT EVAL LOW COMPLEX 30 MIN: CPT

## 2023-01-25 PROCEDURE — 74011250637 HC RX REV CODE- 250/637: Performed by: ORTHOPAEDIC SURGERY

## 2023-01-25 RX ORDER — ASPIRIN 81 MG/1
81 TABLET ORAL 2 TIMES DAILY
Qty: 30 TABLET | Refills: 0 | Status: SHIPPED | OUTPATIENT
Start: 2023-01-25

## 2023-01-25 RX ORDER — OXYCODONE HYDROCHLORIDE 5 MG/1
5 TABLET ORAL
Qty: 28 TABLET | Refills: 0 | Status: SHIPPED | OUTPATIENT
Start: 2023-01-25 | End: 2023-02-01

## 2023-01-25 RX ADMIN — ASPIRIN 81 MG: 81 TABLET, COATED ORAL at 08:44

## 2023-01-25 RX ADMIN — CELECOXIB 200 MG: 200 CAPSULE ORAL at 08:44

## 2023-01-25 RX ADMIN — OXYCODONE 5 MG: 5 TABLET ORAL at 05:53

## 2023-01-25 RX ADMIN — ATORVASTATIN CALCIUM 20 MG: 20 TABLET, FILM COATED ORAL at 08:44

## 2023-01-25 RX ADMIN — POLYETHYLENE GLYCOL 3350 17 G: 17 POWDER, FOR SOLUTION ORAL at 08:44

## 2023-01-25 RX ADMIN — ACETAMINOPHEN 1000 MG: 500 TABLET ORAL at 07:11

## 2023-01-25 RX ADMIN — LEVOTHYROXINE SODIUM 125 MCG: 0.03 TABLET ORAL at 07:12

## 2023-01-25 RX ADMIN — CYANOCOBALAMIN TAB 1000 MCG 2000 MCG: 1000 TAB at 08:44

## 2023-01-25 RX ADMIN — ACETAMINOPHEN 1000 MG: 500 TABLET ORAL at 00:06

## 2023-01-25 RX ADMIN — ACETAMINOPHEN 1000 MG: 500 TABLET ORAL at 13:24

## 2023-01-25 RX ADMIN — OXYCODONE 5 MG: 5 TABLET ORAL at 00:06

## 2023-01-25 RX ADMIN — CEFAZOLIN 2 G: 1 INJECTION, POWDER, FOR SOLUTION INTRAMUSCULAR; INTRAVENOUS at 00:04

## 2023-01-25 RX ADMIN — SENNOSIDES AND DOCUSATE SODIUM 1 TABLET: 50; 8.6 TABLET ORAL at 08:44

## 2023-01-25 NOTE — DISCHARGE SUMMARY
Admit date: 1/24/2023   Admitting Provider: Laure Eisenberg MD    Discharge date: 1/25/2023  Discharging Provider: Laure Eisenberg MD      * Admission Diagnoses: Osteoarthritis [M19.90]    * Discharge Diagnoses:  s/p right TKA  Hospital Problems as of 1/25/2023 Never Reviewed            Codes Class Noted - Resolved POA    Osteoarthritis ICD-10-CM: M19.90  ICD-9-CM: 715.90  10/11/2021 - Present Unknown           [facility] Course: did well with surgery. Cleared PT for d/c    * Procedures: 1/24/23  Procedure(s):  RIGHT TOTAL KNEE ARTHROPLASTY (GEN/SPINAL/REG)      Consults: physical therapy    Significant Diagnostic Studies:     Discharge Exam:  No unexpected swelling  No calf tenderness  Dressing c/d/I  SCDs in situ  NV intact    * Discharge Condition: stable  * Disposition: Home    Discharge Medications:  Current Discharge Medication List        START taking these medications    Details   oxyCODONE IR (ROXICODONE) 5 mg immediate release tablet Take 1 Tablet by mouth every six (6) hours as needed for Pain for up to 7 days. Max Daily Amount: 20 mg.  Qty: 28 Tablet, Refills: 0  Start date: 1/25/2023, End date: 2/1/2023    Associated Diagnoses: Status post right knee replacement      aspirin delayed-release 81 mg tablet Take 1 Tablet by mouth two (2) times a day. Qty: 30 Tablet, Refills: 0  Start date: 1/25/2023           CONTINUE these medications which have NOT CHANGED    Details   atorvastatin (LIPITOR) 20 mg tablet Take 20 mg by mouth daily. levothyroxine (SYNTHROID) 125 mcg tablet Take 125 mcg by mouth Daily (before breakfast). cyanocobalamin 1,000 mcg tablet Take 2,000 mcg by mouth daily. * Follow-up Care/Patient Instructions:   Activity: Activity as tolerated  Diet: Regular Diet  Wound Care: None needed    Follow-up Information       Follow up With Specialties Details Why Leah Pat MD Orthopedic Surgery Go on 2/6/2023 Monday 02/06/2023 appointment at 3:30 PM arrival time 3:15 PM [id number] [facility] [facility]  [phone]      Sarah Lawler MD Orthopedic Surgery Go on 2/9/2023 Thursday 02/09/2023 appointment at 11:20 AM arrival time 11:05 AM [id number] [address]  [address] [address]  [address] Call in 1 day(s) Anticipated [facility] is 1/26/2023, please call them if you do not hear from them by this date in order to schedule home physical therapy.  [address] [facility]  [phone]    Kenya Hughes, [address]   [address] [place] [address]  [phone]              Signed:  Candace Stevens MD  1/25/2023  12:38 PM

## 2023-01-25 NOTE — PROGRESS NOTES
Patient has discharge order for today. Patient is being discharged to home with home health and has been cleared by attending provider, pt/ot and case management. Patient is not being discharged with IV, telemetry or martinez. Vital signs stable and patient is in no signs of distress. Discharge plan of care/case management plan validated with provider discharge order.

## 2023-01-25 NOTE — PROGRESS NOTES
CM met with patient to discuss DCP, patient recc'd for [facility], patient agreeable, no preference given for choice, referral sent via [address] [place] [address] Ave, awaiting accepting agency. 11:45 AM - Patient accepted with [facility] [place], [facility] 1/26/2023. 1:15 PM - Patient is clear to d/c from  to home with [facility] at this time, nurse notified.

## 2023-01-25 NOTE — ROUTINE PROCESS
Bedside shift change report given to Luis Fernando Castro (oncoming nurse) by Luana Dailey (offgoing nurse). Report included the following information SBAR.

## 2023-01-25 NOTE — PROGRESS NOTES
Bedside and Verbal shift change report given to [address] [place] [address] & [facility] ([facility] nurse) by Luis Fernando MCCLELLAND (offgoing nurse). Report included the following information SBAR, OR Summary, Procedure Summary, Intake/Output, and MAR.

## 2023-01-25 NOTE — PROGRESS NOTES
OCCUPATIONAL THERAPY EVALUATION/DISCHARGE  Patient: Daniel Roca (81 y.o. female)  Date: 1/25/2023  Primary Diagnosis: Osteoarthritis [M19.90]  Procedure(s) (LRB):  RIGHT TOTAL KNEE ARTHROPLASTY (GEN/SPINAL/REG) (Right) 1 Day Post-Op   Precautions: falls, R TKR WBAT       ASSESSMENT  Pt is a 73 y/o Female with PMH of OA, L TKR presenting to [facility] with c/o R knee pain, admitted 1/24/23  and being treated for s/p R TKR. Pt received semi-supine in bed upon arrival, AXO x4, and agreeable to OT evaluations at this time. Per pt report, pt lives alone in a one-story home with 3 EULA and B HR, was IND with ADLs and no AD for mobility at [facility]. Other DME owned includes:   Shower chair, bedside commode, [address] Based on current observations, pt presents at functional baseline for ADLs/mobility demonstrating good UE strength/AROM, bed mobility, static/dynamic sitting balance, static/dynamic standing balance, functional activity tolerance, vision, sensation, coordination, cognition/confusion, decreased safety awareness, and pain impacting overall performance of ADLs and functional transfers/mobility. Pt currently requires CGA- SUP for functional mobility and basic ADLS using MARGOTH Copeland Overall, pt tolerates session well. Pt is observed and reported to be very close to functional baseline w/ ADLs/mobility w/ no further OT skills indicated. Pt in agreement. Will therefore d/c patient from OT caseload at this time recommend d/c home w/ family/self care once medically appropriate. Pt had L TKR in 2020, has necessary DME. Sister will be living with patient to assist her with IADLS. Other factors to consider for discharge: IND baseline      Patient will benefit from skilled therapy intervention to address the above noted impairments.        PLAN :  Recommendation for discharge: (in order for the patient to meet his/her long term goals)  Home with Family Care    This discharge recommendation:  Has been made in collaboration with the attending provider and/or case management    IF patient discharges home will need the following DME: patient owns DME required for discharge       SUBJECTIVE:   Patient stated I feel good.     OBJECTIVE DATA SUMMARY:   HISTORY:   Past Medical History:   Diagnosis Date    Arthritis     History of colonic polyps     Hyperlipidemia     Sleep apnea     uses CPAP    Thyroid disease     hypothyroidism     Past Surgical History:   Procedure Laterality Date    HX COLONOSCOPY      HX HYSTERECTOMY      HX HYSTEROSCOPY      HX KNEE REPLACEMENT Left 2020    HX ORTHOPAEDIC      lumbar    ME UNLISTED PROCEDURE ABDOMEN PERITONEUM & OMENTUM      hiatal hernia       Expanded or extensive additional review of patient history:     Home Situation  Home Environment: Private residence  # Steps to Enter: 2  Rails to Enter: Yes  Hand Rails : Bilateral  One/Two Story Residence: One story  Living Alone: Yes  Support Systems: Other Family Member(s) (pt's sister will be staying with her upon d/c)  Patient Expects to be Discharged to[de-identified] Home with home health    Hand dominance: Right    EXAMINATION OF PERFORMANCE DEFICITS:  Cognitive/Behavioral Status:  Neurologic State: Alert  Orientation Level: Oriented X4  Cognition: Appropriate decision making; Follows commands               Hearing: Auditory  Auditory Impairment: None    Vision/Perceptual:                                     Range of Motion:    AROM: Within functional limits                         Strength:    Strength: Within functional limits                Coordination:     Fine Motor Skills-Upper: Left Intact; Right Intact    Gross Motor Skills-Upper: Left Intact; Right Intact    Tone & Sensation:       Sensation: Intact                      Balance:  Sitting: Intact  Standing: Intact; With support  Standing - Static: Good  Standing - Dynamic : Occasional;Fair    Functional Mobility and Transfers for ADLs:  Bed Mobility:       Transfers:  Sit to Stand: Stand-by assistance  Stand to Sit: Stand-by assistance    ADL Assessment:                                            ADL Intervention and task modifications:       Grooming  Washing Face: Modified independent  Washing Hands: Modified independent                   Lower Body Dressing Assistance  Socks: Supervision                Functional Measure:    [facility] AM-PACTM \"6 Clicks\"                                                       Daily Activity Inpatient Short Form  How much help from another person does the patient currently need. .. Total; A Lot A Little None   1. Putting on and taking off regular lower body clothing? []  1 []  2 [x]  3 []  4   2. Bathing (including washing, rinsing, drying)? []  1 []  2 [x]  3 []  4   3. Toileting, which includes using toilet, bedpan or urinal? [] 1 []  2 [x]  3 []  4   4. Putting on and taking off regular upper body clothing? []  1 []  2 [x]  3 []  4   5. Taking care of personal grooming such as brushing teeth? []  1 []  2 []  3 [x]  4   6. Eating meals? []  1 []  2 []  3 [x]  4   © 2007, Trustees of [facility], under license to [employer]. All rights reserved     Score: 20/24     Interpretation of Tool:  Represents clinically-significant functional categories (i.e. Activities of daily living). Percentage of Impairment CH    0%   CI    1-19% CJ    20-39% CK    40-59% CL    60-79% CM    80-99% CN     100%   [facility]  Score 6-24 24 23 20-22 15-19 10-14 7-9 6         Occupational Therapy Evaluation Charge Determination   History Examination Decision-Making   LOW Complexity : Brief history review  LOW Complexity : 1-3 performance deficits relating to physical, cognitive , or psychosocial skils that result in activity limitations and / or participation restrictions  MEDIUM Complexity : Patient may present with comorbidities that affect occupational performnce.  Miniml to moderate modification of tasks or assistance (eg, physical or verbal ) with assesment(s) is necessary to enable patient to complete evaluation       Based on the above components, the patient evaluation is determined to be of the following complexity level: LOW     Pain Ratin/10 R knee    Activity Tolerance: WNL    After treatment patient left in no apparent distress:    Sitting in chair and Call bell within reach    COMMUNICATION/EDUCATION:   The patients plan of care was discussed with: Registered nurse. OT/PT sessions occurred together for increased patient and clinician safety as pt with decreased activity tolerance and requires A of 2 for mobility at this time.      Thank you for this referral.  Joseph Mcpherson OT  Time Calculation: 18 mins

## 2023-01-25 NOTE — PROGRESS NOTES
Discharge instructions reviewed with patient and brother. Patient stated no further questions. Vital signs stable and patient is in no signs of distress. Prescriptions sent to pharmacy. Follow up appointment made. Patient wheeled down by staff and left in private vehicle with family. Patient sent home with extra aquacell dressing.

## 2023-01-25 NOTE — PROGRESS NOTES
PHYSICAL THERAPY TREATMENT  Patient: Caffie Osgood (52 y.o. female)  Date: 1/25/2023  Diagnosis: Osteoarthritis [M19.90] <principal problem not specified>  Procedure(s) (LRB):  RIGHT TOTAL KNEE ARTHROPLASTY (GEN/SPINAL/REG) (Right) 1 Day Post-Op  Precautions:    Chart, physical therapy assessment, plan of care and goals were reviewed. ASSESSMENT  Patient continues with skilled PT services and is progressing towards goals. Pt seated in the recliner upon PT arrival, agreeable to session. Pt ambulated approx 180 ft with RW and CGA and negotiated 4 steps with B rails and no difficulty, CGA. No buckling or LOB noted during ambulation and pt amb with slow, steady step to gt pattern. Patient progressing well and will educate HEP next visit. Rec d/c to HHPT. (See below for objective details and assist levels). Overall pt tolerated session well today with improved mobility. Will continue to benefit from skilled PT services, and will continue to progress as tolerated. Current Level of Function Impacting Discharge (mobility/balance): safety, CGA mobility    Other factors to consider for discharge: assist at home, post surgical care         PLAN :  Patient continues to benefit from skilled intervention to address the above impairments. Continue treatment per established plan of care to address goals. Recommendation for discharge: (in order for the patient to meet his/her long term goals)  Home with [address] [place] [address]    This discharge recommendation:  Has been made in collaboration with the attending provider and/or case management    IF patient discharges home will need the following DME: patient owns DME required for discharge       SUBJECTIVE:   Patient stated I walked the hallway last night.     OBJECTIVE DATA SUMMARY:   Critical Behavior:  Neurologic State: Alert, Appropriate for age, Eyes open spontaneously  Orientation Level: Oriented X4  Cognition: Appropriate decision making, Appropriate for age attention/concentration, Appropriate safety awareness, Follows commands, Recognition of people/places     Functional Mobility Training:    Transfers:  Sit to Stand: Contact guard assistance  Stand to Sit: Contact guard assistance       Balance:  Sitting: Intact (chair level)  Standing: With support; Intact (RW)  Standing - Static: Constant support;Good  Standing - Dynamic : Constant support; Fair  Ambulation/Gait Training:  Distance (ft): 180 Feet (ft)  Assistive Device: Gait belt;Walker, rolling  Ambulation - Level of Assistance: Contact guard assistance  Right Side Weight Bearing: As tolerated    Stairs:  Number of Stairs Trained: 4  Stairs - Level of Assistance: Contact guard assistance   Rail Use: Both       Pain Ratin/10 R knee    Activity Tolerance:   Good    After treatment patient left in no apparent distress:   Sitting in chair, Call bell within reach, and Caregiver / family present    COMMUNICATION/COLLABORATION:   The patients plan of care was discussed with: Registered nurse.        Brittnee Juares, PT   Time Calculation: 15 mins         Problem: Mobility Impaired (Adult and Pediatric)  Goal: *Acute Goals and Plan of Care (Insert Text)  Description: SBA with bed mob x 7 days  SBA with all transfers x7 days  Amb 150-200ft with RW and SBAx1 x7 days  Ascend/descend 2-4 steps with B rails and CGAx1 x7 days    Pt stated goals: to go home  Outcome: Progressing Towards Goal

## 2023-01-25 NOTE — PROGRESS NOTES
PHYSICAL THERAPY TREATMENT  Patient: Lili Cash (89 y.o. female)  Date: 1/25/2023  Diagnosis: Osteoarthritis [M19.90] <principal problem not specified>  Procedure(s) (LRB):  RIGHT TOTAL KNEE ARTHROPLASTY (GEN/SPINAL/REG) (Right) 1 Day Post-Op  Precautions:    Chart, physical therapy assessment, plan of care and goals were reviewed. ASSESSMENT  Patient continues with skilled PT services and is progressing towards goals. Pt in chair upon PT arrival, agreeable to session. Patient continues to demonstrate safe ambulation and amb increased distance steadily with no LOB noted. Patient completed seated LE therex and tolerated well with good quad activation and ROM. (See below for objective details and assist levels). Rec d/c to HHPT with family care. Overall pt tolerated session well today with improved mobility. Will continue to benefit from skilled PT services, and will continue to progress as tolerated. Current Level of Function Impacting Discharge (mobility/balance): CGA/SBA mobility    Other factors to consider for discharge: safety and assist at home         PLAN :  Patient continues to benefit from skilled intervention to address the above impairments. Continue treatment per established plan of care to address goals.     Recommendation for discharge: (in order for the patient to meet his/her long term goals)  Home with [address] [place] [address]    This discharge recommendation:  Has been made in collaboration with the attending provider and/or case management    IF patient discharges home will need the following DME: patient owns DME required for discharge       SUBJECTIVE:   Patient stated It really doesn't hurt    OBJECTIVE DATA SUMMARY:   Critical Behavior:  Neurologic State: Alert  Orientation Level: Oriented X4  Cognition: Appropriate decision making, Follows commands     Functional Mobility Training:    Transfers:  Sit to Stand: Stand-by assistance  Stand to Sit: Stand-by assistance Balance:  Sitting: Intact  Standing: Intact; With support  Standing - Static: Good  Standing - Dynamic : Occasional;Fair  Ambulation/Gait Training:  Distance (ft): 200 Feet (ft)  Assistive Device: Gait belt;Walker, rolling  Ambulation - Level of Assistance: Stand-by assistance  Right Side Weight Bearing: As tolerated      Therapeutic Exercises:   SEATED  EXERCISES   Sets   Reps   Active Active Assist   Passive Self ROM   Comments   Ankle Pumps 1 30 [x]                                        []                                        []                                        []                                           Quad Sets 1 30 [x]                                        []                                        []                                        []                                           Heel Slides 1 30 [x]                                        []                                        []                                        []                                           Hip Abduction 1 30 [x]                                        []                                        []                                        []                                           Short Arc Quads 1 30 [x]                                        []                                        []                                        []                                           ROM -8-82      Pain Ratin-2/10 R knee    Activity Tolerance:   Good    After treatment patient left in no apparent distress:    Sitting in chair and Call bell within reach      COMMUNICATION/COLLABORATION:   The patients plan of care was discussed with: Registered nurse.        Geremias Tucker, PT   Time Calculation: 25 mins         Problem: Mobility Impaired (Adult and Pediatric)  Goal: *Acute Goals and Plan of Care (Insert Text)  Description: SBA with bed mob x 7 days  SBA with all transfers x7 days  Amb 150-200ft with RW and SBAx1 x7 days  Ascend/descend 2-4 steps with B rails and CGAx1 x7 days    Pt stated goals: to go home  1/25/2023 1332 by Jorge Newton  Outcome: Progressing Towards Goal

## 2023-01-30 NOTE — ANESTHESIA POSTPROCEDURE EVALUATION
Procedure(s):  RIGHT TOTAL KNEE ARTHROPLASTY (GEN/SPINAL/REG).     spinal    Anesthesia Post Evaluation      Multimodal analgesia: multimodal analgesia used between 6 hours prior to anesthesia start to PACU discharge  Patient location during evaluation: PACU  Patient participation: complete - patient participated  Level of consciousness: awake  Pain score: 0  Pain management: adequate  Airway patency: patent  Anesthetic complications: no  Cardiovascular status: acceptable  Respiratory status: acceptable  Hydration status: acceptable  Post anesthesia nausea and vomiting:  controlled  Final Post Anesthesia Temperature Assessment:  Normothermia (36.0-37.5 degrees C)      INITIAL Post-op Vital signs:   Vitals Value Taken Time   /73 01/24/23 1120   Temp 36.3 °C (97.4 °F) 01/24/23 1120   Pulse 60 01/24/23 1120   Resp 18 01/24/23 1120   SpO2 96 % 01/24/23 1120

## 2023-05-20 RX ORDER — LEVOTHYROXINE SODIUM 0.12 MG/1
125 TABLET ORAL
COMMUNITY

## 2023-05-20 RX ORDER — ATORVASTATIN CALCIUM 20 MG/1
20 TABLET, FILM COATED ORAL DAILY
COMMUNITY

## 2023-05-20 RX ORDER — ASPIRIN 81 MG/1
81 TABLET ORAL 2 TIMES DAILY
COMMUNITY
Start: 2023-01-25

## (undated) DEVICE — 2108 SERIES SAGITTAL BLADE AGGRESSIVE  (18.5 X 1.24 X 83.5MM)

## (undated) DEVICE — 3M™ STERI-STRIP™ REINFORCED ADHESIVE SKIN CLOSURES, R1547, 1/2 IN X 4 IN (12 MM X 100 MM), 6 STRIPS/ENVELOPE: Brand: 3M™ STERI-STRIP™

## (undated) DEVICE — 450 ML BOTTLE OF 0.05% CHLORHEXIDINE GLUCONATE IN 99.95% STERILE WATER FOR IRRIGATION, USP AND APPLICATOR.: Brand: IRRISEPT ANTIMICROBIAL WOUND LAVAGE

## (undated) DEVICE — INTENDED FOR TISSUE SEPARATION, AND OTHER PROCEDURES THAT REQUIRE A SHARP SURGICAL BLADE TO PUNCTURE OR CUT.: Brand: BARD-PARKER ® CARBON RIB-BACK BLADES

## (undated) DEVICE — GARMENT COMPR REG WOM SZ 9 M SZ 7 FT NONSTERILE FLOTRN

## (undated) DEVICE — SOLUTION IRRIG 3000ML 0.9% SOD CHL USP UROMATIC PLAS CONT

## (undated) DEVICE — SOUTHSIDE TURNOVER: Brand: MEDLINE INDUSTRIES, INC.

## (undated) DEVICE — SOLUTION IRRIG 1000ML STRL H2O USP PLAS POUR BTL

## (undated) DEVICE — GLOVE SURG SZ 75 L12IN FNGR THK79MIL GRN LTX FREE

## (undated) DEVICE — OSCILLATING TIP SAW CARTRIDGE: Brand: PRECISION FALCON

## (undated) DEVICE — PADDING CAST W6INXL4YD ST COT COHESIVE HND TEARABLE SPEC

## (undated) DEVICE — SUTURE VCRL + SZ 2-0 L36IN ABSRB UD L36MM CT-1 1/2 CIR VCP945H

## (undated) DEVICE — PREP SKN CHLRAPRP APL 26ML STR --

## (undated) DEVICE — DRESSING HYDROFIBER AQUACEL AG ADVANTAGE 3.5X14 IN

## (undated) DEVICE — SUTURE MCRYL + SZ 3-0 L27IN ABSRB UD PS1 L24MM 3/8 CIR REV MCP936H

## (undated) DEVICE — BASIC SINGLE BASIN-LF: Brand: MEDLINE INDUSTRIES, INC.

## (undated) DEVICE — HANDPIECE SET WITH HIGH FLOW TIP AND SUCTION TUBE: Brand: INTERPULSE

## (undated) DEVICE — 3M™ STERI-DRAPE™ U-DRAPE 1015: Brand: STERI-DRAPE™

## (undated) DEVICE — 3M™ COBAN™ NL STERILE NON-LATEX SELF-ADHERENT WRAP, 2084S, 4 IN X 5 YD (10 CM X 4,5 M), 18 ROLLS/CASE: Brand: 3M™ COBAN™

## (undated) DEVICE — CEMENT MIXING SYSTEM WITH FEMORAL BREAKWAY NOZZLE: Brand: REVOLUTION

## (undated) DEVICE — HOOD, PEEL-AWAY: Brand: FLYTE

## (undated) DEVICE — GLOVE ORTHO 7 1/2   MSG9475

## (undated) DEVICE — SUTURE STRATAFIX SZ 1 L14IN ABSRB VLT L36MM MO-4 TAPERPOINT SXPD2B400

## (undated) DEVICE — 3M™ STERI-DRAPE™ U-DRAPE 1067 1067 5/BX 4BX/CS/CTN&#X20;: Brand: STERI-DRAPE™

## (undated) DEVICE — NEEDLE SPNL 18GA L3.5IN W/ QNCKE SHARPER BVL DURA CLICK

## (undated) DEVICE — APPLICATOR MEDICATED 26 CC SOLUTION HI LT ORNG CHLORAPREP

## (undated) DEVICE — MARKER SKIN 2 TIP UTIL W/ RULER REG LF

## (undated) DEVICE — DRAPE,TOP,102X53,STERILE: Brand: MEDLINE

## (undated) DEVICE — SOLUTION IRRIG 500ML 0.9% SOD CHL USP POUR PLAS BTL

## (undated) DEVICE — Device

## (undated) DEVICE — ADHESIVE SKIN CLSR 0.7ML TOP DERMBND ADV

## (undated) DEVICE — 3M™ IOBAN™ 2 ANTIMICROBIAL INCISE DRAPE 6651EZ: Brand: IOBAN™ 2

## (undated) DEVICE — GLOVE SURG SZ 8 L12IN FNGR THK79MIL GRN LTX FREE

## (undated) DEVICE — SUTURE VCRL + SZ 1 L36IN ABSRB UD L36MM CT-1 1/2 CIR VCP947H

## (undated) DEVICE — DRSG POSTOP PRMSL AG 3.5X14IN

## (undated) DEVICE — SUTURE VCRL SZ 0 L36IN ABSRB UD L36MM CT-1 1/2 CIR J946H

## (undated) DEVICE — T5 HOOD WITH PEEL AWAY FACE SHIELD

## (undated) DEVICE — STRYKER PERFORMANCE SERIES SAGITTAL BLADE: Brand: STRYKER PERFORMANCE SERIES

## (undated) DEVICE — ZIMMER® STERILE DISPOSABLE TOURNIQUET CUFF WITH PLC, DUAL PORT, SINGLE BLADDER, 34 IN. (86 CM)

## (undated) DEVICE — 3M™ STERI-DRAPE™ INCISE DRAPE 1050 (60CM X 45CM): Brand: STERI-DRAPE™

## (undated) DEVICE — SUTURE VCRL SZ 2-0 L27IN ABSRB UD L26MM CT-2 1/2 CIR J269H

## (undated) DEVICE — MAJOR ORTHO PROCEDURE PACK: Brand: MEDLINE INDUSTRIES, INC.

## (undated) DEVICE — SYSTEM SKIN CLSR 22CM DERMBND PRINEO

## (undated) DEVICE — SUT MCRYL + 3-0 27IN PS1 UD --

## (undated) DEVICE — COVER,TABLE,HEAVY DUTY,79"X110",STRL: Brand: MEDLINE

## (undated) DEVICE — SUTURE STRATAFIX SPRL SZ 1 L14IN ABSRB VLT L48CM CTX 1/2 SXPD2B405

## (undated) DEVICE — DRAPE, HEAVY DUTY, STERILE. FOR A 6' BIG CASE BACK TABLE MODEL 429: Brand: OR SPECIFIC

## (undated) DEVICE — SOL TOP ALC ISO 70% 4OZ --

## (undated) DEVICE — 3M™ IOBAN™ 2 ANTIMICROBIAL INCISE DRAPE 6650EZ: Brand: IOBAN™ 2

## (undated) DEVICE — GARMENT,MEDLINE,DVT,INT,CALF,MED, GEN2: Brand: MEDLINE